# Patient Record
Sex: MALE | Race: OTHER
[De-identification: names, ages, dates, MRNs, and addresses within clinical notes are randomized per-mention and may not be internally consistent; named-entity substitution may affect disease eponyms.]

---

## 2017-05-13 ENCOUNTER — HOSPITAL ENCOUNTER (EMERGENCY)
Dept: HOSPITAL 53 - M ED | Age: 47
Discharge: HOME | End: 2017-05-13
Payer: SELF-PAY

## 2017-05-13 VITALS — WEIGHT: 185 LBS | HEIGHT: 72 IN | BODY MASS INDEX: 25.06 KG/M2

## 2017-05-13 VITALS — DIASTOLIC BLOOD PRESSURE: 65 MMHG | SYSTOLIC BLOOD PRESSURE: 114 MMHG

## 2017-05-13 DIAGNOSIS — L02.411: Primary | ICD-10-CM

## 2017-05-16 ENCOUNTER — HOSPITAL ENCOUNTER (EMERGENCY)
Dept: HOSPITAL 53 - M ED | Age: 47
Discharge: HOME | End: 2017-05-16
Payer: COMMERCIAL

## 2017-05-16 VITALS — BODY MASS INDEX: 24.38 KG/M2 | HEIGHT: 72 IN | WEIGHT: 180 LBS

## 2017-05-16 VITALS — DIASTOLIC BLOOD PRESSURE: 63 MMHG | SYSTOLIC BLOOD PRESSURE: 101 MMHG

## 2017-05-16 DIAGNOSIS — L73.2: Primary | ICD-10-CM

## 2017-10-16 ENCOUNTER — HOSPITAL ENCOUNTER (EMERGENCY)
Dept: HOSPITAL 53 - M ED | Age: 47
Discharge: HOME | End: 2017-10-16
Payer: COMMERCIAL

## 2017-10-16 VITALS
HEIGHT: 72 IN | WEIGHT: 187.83 LBS | BODY MASS INDEX: 25.44 KG/M2 | SYSTOLIC BLOOD PRESSURE: 117 MMHG | DIASTOLIC BLOOD PRESSURE: 74 MMHG

## 2017-10-16 DIAGNOSIS — I51.9: ICD-10-CM

## 2017-10-16 DIAGNOSIS — L02.411: Primary | ICD-10-CM

## 2017-10-16 DIAGNOSIS — Z95.0: ICD-10-CM

## 2017-10-16 DIAGNOSIS — Z87.891: ICD-10-CM

## 2018-01-24 ENCOUNTER — HOSPITAL ENCOUNTER (OUTPATIENT)
Dept: HOSPITAL 53 - M OPP | Age: 48
Discharge: HOME | End: 2018-01-24
Attending: INTERNAL MEDICINE
Payer: COMMERCIAL

## 2018-01-24 DIAGNOSIS — R00.1: ICD-10-CM

## 2018-01-24 DIAGNOSIS — Z87.891: ICD-10-CM

## 2018-01-24 DIAGNOSIS — G71.11: ICD-10-CM

## 2018-01-24 DIAGNOSIS — R13.10: Primary | ICD-10-CM

## 2018-01-24 DIAGNOSIS — Z95.0: ICD-10-CM

## 2018-01-24 DIAGNOSIS — R12: ICD-10-CM

## 2018-01-24 DIAGNOSIS — K31.89: ICD-10-CM

## 2018-01-24 PROCEDURE — 43239 EGD BIOPSY SINGLE/MULTIPLE: CPT

## 2021-05-26 ENCOUNTER — HOSPITAL ENCOUNTER (INPATIENT)
Dept: HOSPITAL 53 - M ED | Age: 51
LOS: 1 days | Discharge: HOME | DRG: 139 | End: 2021-05-27
Attending: STUDENT IN AN ORGANIZED HEALTH CARE EDUCATION/TRAINING PROGRAM | Admitting: STUDENT IN AN ORGANIZED HEALTH CARE EDUCATION/TRAINING PROGRAM
Payer: SELF-PAY

## 2021-05-26 VITALS — SYSTOLIC BLOOD PRESSURE: 112 MMHG | DIASTOLIC BLOOD PRESSURE: 71 MMHG

## 2021-05-26 VITALS — WEIGHT: 176.81 LBS | HEIGHT: 72 IN | BODY MASS INDEX: 23.95 KG/M2

## 2021-05-26 VITALS — DIASTOLIC BLOOD PRESSURE: 76 MMHG | SYSTOLIC BLOOD PRESSURE: 126 MMHG

## 2021-05-26 DIAGNOSIS — J98.11: ICD-10-CM

## 2021-05-26 DIAGNOSIS — Z87.891: ICD-10-CM

## 2021-05-26 DIAGNOSIS — L02.411: ICD-10-CM

## 2021-05-26 DIAGNOSIS — Z20.822: ICD-10-CM

## 2021-05-26 DIAGNOSIS — R09.02: ICD-10-CM

## 2021-05-26 DIAGNOSIS — Z95.0: ICD-10-CM

## 2021-05-26 DIAGNOSIS — L73.2: ICD-10-CM

## 2021-05-26 DIAGNOSIS — D72.829: ICD-10-CM

## 2021-05-26 DIAGNOSIS — R50.9: ICD-10-CM

## 2021-05-26 DIAGNOSIS — R00.1: ICD-10-CM

## 2021-05-26 DIAGNOSIS — Z79.899: ICD-10-CM

## 2021-05-26 DIAGNOSIS — J18.9: Primary | ICD-10-CM

## 2021-05-26 LAB
ALBUMIN SERPL BCG-MCNC: 3.4 GM/DL (ref 3.2–5.2)
ALT SERPL W P-5'-P-CCNC: 30 U/L (ref 12–78)
BASOPHILS # BLD AUTO: 0 10^3/UL (ref 0–0.2)
BASOPHILS NFR BLD AUTO: 0.3 % (ref 0–1)
BILIRUB CONJ SERPL-MCNC: 0.3 MG/DL (ref 0–0.2)
BILIRUB SERPL-MCNC: 0.9 MG/DL (ref 0.2–1)
BUN SERPL-MCNC: 6 MG/DL (ref 7–18)
CALCIUM SERPL-MCNC: 9.6 MG/DL (ref 8.5–10.1)
CHLORIDE SERPL-SCNC: 103 MEQ/L (ref 98–107)
CK MB CFR.DF SERPL CALC: 0.63
CK MB SERPL-MCNC: 3.5 NG/ML (ref ?–3.6)
CK SERPL-CCNC: 559 U/L (ref 39–308)
CO2 SERPL-SCNC: 29 MEQ/L (ref 21–32)
CREAT SERPL-MCNC: 0.7 MG/DL (ref 0.7–1.3)
EOSINOPHIL # BLD AUTO: 0 10^3/UL (ref 0–0.5)
EOSINOPHIL NFR BLD AUTO: 0 % (ref 0–3)
ETHANOL SERPL-MCNC: < 0.003 % (ref 0–0.01)
GFR SERPL CREATININE-BSD FRML MDRD: > 60 ML/MIN/{1.73_M2} (ref 60–?)
GLUCOSE SERPL-MCNC: 92 MG/DL (ref 70–100)
HCT VFR BLD AUTO: 52 % (ref 42–52)
HGB BLD-MCNC: 16.8 G/DL (ref 13.5–17.5)
LYMPHOCYTES # BLD AUTO: 1.1 10^3/UL (ref 1.5–5)
LYMPHOCYTES NFR BLD AUTO: 8 % (ref 24–44)
MCH RBC QN AUTO: 30.2 PG (ref 27–33)
MCHC RBC AUTO-ENTMCNC: 32.3 G/DL (ref 32–36.5)
MCV RBC AUTO: 93.5 FL (ref 80–96)
MONOCYTES # BLD AUTO: 1.3 10^3/UL (ref 0–0.8)
MONOCYTES NFR BLD AUTO: 9.4 % (ref 2–8)
NEUTROPHILS # BLD AUTO: 11.4 10^3/UL (ref 1.5–8.5)
NEUTROPHILS NFR BLD AUTO: 81.7 % (ref 36–66)
PLATELET # BLD AUTO: 220 10^3/UL (ref 150–450)
POTASSIUM SERPL-SCNC: 4.5 MEQ/L (ref 3.5–5.1)
PROT SERPL-MCNC: 7.6 GM/DL (ref 6.4–8.2)
RBC # BLD AUTO: 5.56 10^6/UL (ref 4.3–6.1)
SODIUM SERPL-SCNC: 138 MEQ/L (ref 136–145)
T4 FREE SERPL-MCNC: 1.04 NG/DL (ref 0.76–1.46)
TROPONIN I SERPL-MCNC: < 0.02 NG/ML (ref ?–0.1)
TSH SERPL DL<=0.005 MIU/L-ACNC: 0.48 UIU/ML (ref 0.36–3.74)
WBC # BLD AUTO: 13.9 10^3/UL (ref 4–10)

## 2021-05-26 RX ADMIN — DEXTROSE MONOHYDRATE SCH MLS/HR: 5 INJECTION INTRAVENOUS at 16:35

## 2021-05-26 NOTE — REP
INDICATION:

fever, hypoxia



COMPARISON:

None



TECHNIQUE:

Axial noncontrast images from the thoracic inlet to the upper abdomen with coronal and

sagittal reformations.



This CT examination was performed using the following dose reduction techniques:

Automated exposure control, adjustment of mA and/or kv according to the patient's

size, and use of iterative reconstruction technique.



FINDINGS:

Mild to moderate bibasilar atelectasis (left greater than right) noted.  No effusion.

No pneumothorax.  Underlying chronic mild emphysematous changes suggested.

Tracheobronchial tree is patent.  No obvious adenopathy by noncontrast evaluation.

Mediastinum demonstrates normal thoracic aorta and pulmonary vasculature.  Pacemaker

noted without cardiomegaly or pericardial effusion.



IMPRESSION:

Mild to moderate bibasilar atelectasis.





<Electronically signed by Manish Butler > 05/26/21 0944

## 2021-05-26 NOTE — REPVR
PROCEDURE INFORMATION: 

Exam: XR Chest 

Exam date and time: 5/26/2021 6:03 AM 

Age: 50 years old 

Clinical indication: Pain; Other: Not specified; Additional info: Trauma 



TECHNIQUE: 

Imaging protocol: XR of the chest. 

Views: 2 views. 



COMPARISON: 

CR Chest, 2 view PA, Lat 6/1/2016 8:22 AM 



FINDINGS: 

Lungs: Comparison to the previous chest radiograph from 06/01/2016 shows 

interval development of subsegmental atelectasis in the right lung base. The 

remainder of the lungs are otherwise well-aerated. 

Pleural spaces: Unremarkable. No pleural effusion. No pneumothorax. 

Heart/Mediastinum: A left subclavian dual-chamber pacemaker is present in 

satisfactory position, unchanged. The heart size is normal. 

Bones/joints: Unremarkable. 



IMPRESSION: 



1. Comparison to the previous chest radiograph from 06/01/2016 shows interval 

development of subsegmental atelectasis in the right lung base. The remainder 

of the lungs are otherwise well-aerated. 



2. A left subclavian dual-chamber pacemaker is present in satisfactory 

position, unchanged. The heart size is normal. 







Electronically signed by: Damion Selby On 05/26/2021  06:54:11 AM

## 2021-05-26 NOTE — HPEPDOC
General


Date of Admission


May 26, 2021 at 12:36


Date of Service:  May 26, 2021


Chief Complaint


The patient is a 50-year-old male admitted with a reason for visit of Fever, 

Hypoxia, & Leukocytosis.


Source:  Patient





History of Present Illness


Mr. De Santiago is a 50 year old male with bradycardia s/p pacemaker placement who 

presents after a fall and generalized weakness. Yesterday he had his second 

COVID vaccination. He does not know which brand he had, but he did not have any 

symptoms after the first COVID vaccination. After his second vaccination, his 

arm was sore. He was sitting on the couch when he fell to the ground. He denies 

loss of consciousness, but reported lightheadedness/dizzines, blurry vision, and

generalized weakness. His arms were sore. His legs were weak. He could not stand

or crawl. He told me he was down for about 8 hours. Afterwards, he reached onto 

the couch and grabbed his phone to call 911. He did not tell me why he waited 8 

hours, but he said he could not reach initially. He was brought to the ED for 

evaluation. Work up in the ED was significant for leukocytosis of 13.9, hypoxia 

with pO2 of 60, and fever of 101. On examination, patient had course 

rales/rhonchi. When I saw patient, he was laying in bed. he had a red right eye,

but says that he had bumped his mask into that eye. Otherwise, he had a cough 

since yesterday. It is a nonproductive cough. Imaging did not suggest 

infiltrate, but atelectasis, but he may have early pneumonia. Otherwise, no 

diarrhea or abdominal pain to suggest GI pathology. No neck stiffness or 

confusion to suggest brain pathology. Patient will be admitted for CAP.





Home Medications


No Active Prescriptions or Reported Meds





Allergies


Coded Allergies:  


     No Known Allergies (Unverified , 5/26/21)





Past Medical History


Medical History


1. Bradycardia requiring pacemaker


Surgical History


1. Pacemaker placement





Family History


Denies knowledge of medical history in parents





Social History


* Smoker:  former Smoker (Quit 10 years ago, about 1 pack per month)


Alcohol:  occationally (Social drinker of beer)


Drugs:  denies





A-FIB/CHADSVASC


A-FIB History


Current/History of A-Fib/PAF?:  No





Review of Systems


Constitutional:  Reports: Weakness; 


   Denies: Chills


Eyes:  Reports: Vision change (Blurry vision this morning when falling)


ENT:  Denies: Sore Throat


Skin:  Reports: Rash (Boil in right armpit)


Pulmonary:  Reports: Cough (Non productive); 


   Denies: Dyspnea


Cardiovascular:  Reports: Lt Headedness; 


   Denies: Chest Pain


Gastrointestinal:  Denies: Abdominal Pain, Diarrhea


Genitourinary:  Denies: Dysuria


Hematologic:  Denies: Bruising


Musculoskeletal:  Denies: Neck Pain, Back Pain


Neurological:  Denies: Numbness





Physical Examination


General Exam:  Positive: Alert, Cooperative


Eye Exam:  Positive: EOMI, Other Eye Symptoms (Eye redness in right eye, says he

poked his eye with mask accidentially); 


   Negative: Sclera icteric


Neck Exam:  Positive: Supple


Chest Exam:  Positive: Rales, Rhonchi


Heart Exam:  Positive: Rate Normal, Regular Rhythm


Abdomen Exam:  Positive: Normal bowel sounds, Soft; 


   Negative: Tenderness


Extremity Exam:  Negative: Edema


Skin Exam:  Positive: Other skin issue (dry)


Neuro Exam:  Positive: Normal Speech, Cranial Nerves 3-12 NL


Psych Exam:  Positive: Oriented x 3, Other (sometimes evasive with questions)





Vital Signs





Vital Signs








  Date Time  Temp Pulse Resp B/P (MAP) Pulse Ox O2 Delivery O2 Flow Rate FiO2


 


5/26/21 12:00  67 18 118/68 (85) 98 Nasal Cannula 1.0 


 


5/26/21 10:15 97.6       











Laboratory Data


Labs 24H


Laboratory Tests 2


5/26/21 05:43: 


Immature Granulocyte % (Auto) 0.6, Neutrophils (%) (Auto) 81.7H, Lymphocytes (%)

(Auto) 8.0L, Monocytes (%) (Auto) 9.4H, Eosinophils (%) (Auto) 0.0, Basophils 

(%) (Auto) 0.3, Neutrophils # (Auto) 11.4H, Lymphocytes # (Auto) 1.1L, Monocytes

# (Auto) 1.3H, Eosinophils # (Auto) 0.0, Basophils # (Auto) 0.0, Nucleated Red 

Blood Cells % (auto) 0.0, Anion Gap 6L, Glomerular Filtration Rate > 60.0, Leon

cium Level 9.6, Total Bilirubin 0.9, Direct Bilirubin 0.3H, Aspartate Amino 

Transf (AST/SGOT) 30, Alanine Aminotransferase (ALT/SGPT) 30, Alkaline 

Phosphatase 98, Total Creatine Kinase 559H, Creatine Kinase MB 3.5, Creatine 

Kinase MB Relative Index 0.63, Troponin I < 0.02, Total Protein 7.6, Albumin 

3.4, Albumin/Globulin Ratio 0.8, Thyroid Stimulating Hormone (TSH) 0.478, Free 

Thyroxine 1.04, Ethyl Alcohol Level < 0.003


5/26/21 08:11: 


POC pH (Misc Panel) 7.404, POC Base Excess (Misc Panel) 1.0, POC Saturated 

Percent O2 (Misc) 91L, POC pO2 (Misc Panel) 60.0L, POC pCO2 (Misc Panel) 40.5, 

POC HCO3 (Misc Panel) 25.4, POC Total CO2 (Misc Panel) 27.0


5/26/21 10:58: 


Urine Color YELLOW, Urine Appearance CLEAR, Urine pH 6.0, Urine Specific Gravity

1.013, Urine Protein NEGATIVE, Urine Glucose (UA) NEGATIVE, Urine Ketones 

TRACEH, Urine Blood NEGATIVE, Urine Nitrite NEGATIVE, Urine Bilirubin NEGATIVE, 

Urine Urobilinogen 4.0H, Urine Leukocyte Esterase NEGATIVE, Urine WBC (Auto) 2, 

Urine RBC (Auto) 3, Urine Hyaline Casts (Auto) 0, Urine Bacteria (Auto) N

EGATIVE, Urine Squamous Epithelial Cells 1, Urine Mucus (Auto) SMALL, Urine 

Sperm (Auto) 


5/26/21 11:44: POC Lactate (Misc Panel) 0.87


CBC/BMP


Laboratory Tests


5/26/21 05:43








Microbiology





Microbiology


5/26/21 Blood Culture, Received


          Pending


5/26/21 Blood Culture, Received


          Pending


5/26/21 Respiratory Virus Panel (PCR) ([facility]) - Final, Complete





 Assessment/Plan


Mr. De Santiago is a 50 year old male with bradycardia s/p pacemaker placement who 

presents after a fall and generalized weakness. He has had a cough which started

yesterday and his lungs had rales/rhonchi. He also has hypoxia, fever, and 

leukocytosis. Although imaging suggested atelectasis, he may have early 

pneumonia. Patient will be treated for CAP with ceftriaxone and doxycycline. 

Patient qTC is 505, thus doxycycline was chosen over azithromycin. 





His weakness may be from his pneumonia. Physical therapy ordered.





Plan / VTE


VTE Prophylaxis Ordered?:  Yes





Plan


Plan


1. CAP


-Hypoxia, fever, cough, and leukocytosis


-Respiratory panel negative


-Ceftriaxone and doxycycline day 1


-Imaging demonstrated atelectasis. Will order IS


-Ordered sputum culture, legionella, and strep


-Blood cultures pending


-ESR and CRP pending





2. Bradycardia s/p pacemaker


-Patient does not know why he had bradycardia. Denies history of tick bite


-Patient had history of complete heart block


-Supportive care and monitoring heart rate





3. DVT ppx


-Lovenox





Disposition: Pending clinical improvement











JEANE LARKIN DO               May 26, 2021 14:17

## 2021-05-26 NOTE — REPVR
PROCEDURE INFORMATION: 

Exam: CT Head Without Contrast 

Exam date and time: 5/26/2021 3:17 PM 

Age: 50 years old 

Clinical indication: Injury or trauma; Fall; Blunt trauma (contusions or 

hematomas) 



TECHNIQUE: 

Imaging protocol: Computed tomography of the head without contrast. 

Radiation optimization: All CT scans at this facility use at least one of these 

dose optimization techniques: automated exposure control; mA and/or kV 

adjustment per patient size (includes targeted exams where dose is matched to 

clinical indication); or iterative reconstruction. 



COMPARISON: 

No relevant prior studies available. 



FINDINGS: 

Brain: Mild-to-moderate global parenchymal atrophy atypical in this age group. 

Cerebral ventricles: No ventriculomegaly. 

Paranasal sinuses: Visualized sinuses are unremarkable. No fluid levels. 

Mastoid air cells: Visualized mastoid air cells are well aerated. 

Bones/joints: Unremarkable. No acute fracture. 

Soft tissues: Unremarkable. 



Nasal cavity: Bilateral caitlin bullosa. 



IMPRESSION: 

1. Mild-to-moderate global parenchymal atrophy atypical in this age group. 

2. No acute intracranial findings. 



Electronically signed by: Mati Garrett On 05/26/2021  19:21:55 PM

## 2021-05-27 VITALS — SYSTOLIC BLOOD PRESSURE: 118 MMHG | DIASTOLIC BLOOD PRESSURE: 73 MMHG

## 2021-05-27 VITALS — DIASTOLIC BLOOD PRESSURE: 76 MMHG | SYSTOLIC BLOOD PRESSURE: 123 MMHG

## 2021-05-27 LAB
BUN SERPL-MCNC: 7 MG/DL (ref 7–18)
CALCIUM SERPL-MCNC: 9.1 MG/DL (ref 8.5–10.1)
CHLORIDE SERPL-SCNC: 108 MEQ/L (ref 98–107)
CO2 SERPL-SCNC: 28 MEQ/L (ref 21–32)
CREAT SERPL-MCNC: 0.6 MG/DL (ref 0.7–1.3)
GFR SERPL CREATININE-BSD FRML MDRD: > 60 ML/MIN/{1.73_M2} (ref 60–?)
GLUCOSE SERPL-MCNC: 89 MG/DL (ref 70–100)
HCT VFR BLD AUTO: 49.9 % (ref 42–52)
HGB BLD-MCNC: 15.9 G/DL (ref 13.5–17.5)
MCH RBC QN AUTO: 30.2 PG (ref 27–33)
MCHC RBC AUTO-ENTMCNC: 31.9 G/DL (ref 32–36.5)
MCV RBC AUTO: 94.7 FL (ref 80–96)
PLATELET # BLD AUTO: 195 10^3/UL (ref 150–450)
POTASSIUM SERPL-SCNC: 3.7 MEQ/L (ref 3.5–5.1)
RBC # BLD AUTO: 5.27 10^6/UL (ref 4.3–6.1)
SODIUM SERPL-SCNC: 140 MEQ/L (ref 136–145)
WBC # BLD AUTO: 11.1 10^3/UL (ref 4–10)

## 2021-05-27 RX ADMIN — DEXTROSE MONOHYDRATE SCH MLS/HR: 5 INJECTION INTRAVENOUS at 03:01

## 2021-05-27 NOTE — DS.PDOC
Discharge Summary


General


Date of Admission


May 26, 2021 at 12:36


Date of Discharge


May 27, 2021





Discharge Summary


PROCEDURES PERFORMED DURING STAY: None





ADMITTING DIAGNOSES: 


1. Community acquired pneumonia


2. Hidradenitis suppurativa


3. Bradycardia s/p pacemaker





DISCHARGE DIAGNOSES:


1. Community acquired pneumonia


2. Hidradenitis suppurativa


3. Bradycardia s/p pacemaker





COMPLICATIONS/CHIEF COMPLAINT: Fever, Hypoxia, & Leukocytosis.





HISTORY OF PRESENT ILLNESS: Mr. De Santiago is a 50 year old male with bradycardia 

s/p pacemaker placement who presents after a fall and generalized weakness. 

Yesterday he had his second COVID vaccination. He does not know which brand he 

had, but he did not have any symptoms after the first COVID vaccination. After 

his second vaccination, his arm was sore. He was sitting on the couch when he 

fell to the ground. He denies loss of consciousness, but reported lighthe

adedness/dizzines, blurry vision, and generalized weakness. His arms were sore. 

His legs were weak. He could not stand or crawl. He told me he was down for 

about 8 hours. Afterwards, he reached onto the couch and grabbed his phone to 

call 911. He did not tell me why he waited 8 hours, but he said he could not 

reach initially. He was brought to the ED for evaluation. Work up in the ED was 

significant for leukocytosis of 13.9, hypoxia with pO2 of 60, and fever of 101. 

On examination, patient had course rales/rhonchi. When I saw patient, he was 

laying in bed. he had a red right eye, but says that he had bumped his mask into

that eye. Otherwise, he had a cough since yesterday. It is a nonproductive 

cough. Imaging did not suggest infiltrate, but atelectasis, but he may have 

early pneumonia. Otherwise, no diarrhea or abdominal pain to suggest GI 

pathology. No neck stiffness or confusion to suggest brain pathology. Patient 

will be admitted for CAP.





HOSPITAL COURSE: Later that day, nurse noted patient's right armpit had a lump 

with foul smelling pus. I examined the area. Does not appear to be cellulitis. 

No erythema, but there was pus that could be expressed. Cultures were taken. 

Otherwise, patient did well overnight and was afebril. The next day, his lungs 

sounded completely clear. He felt well and felt ready for home. He recovered 

quicker than expected. Patient was discharged home with doxycycline and 

cefdinir.





DISCHARGE MEDICATIONS: Please see below.


 


ALLERGIES: Please see below.





PHYSICAL EXAMINATION ON DISCHARGE:


VITAL SIGNS: Please see below.


GENERAL: Comfortable, in no apparent distress.


HEENT: Head normocephalic/atraumatic, EOMI.


NECK: Supple.


RESPIRATORY: Lungs clear to auscultation bilaterally, no rales, wheeze or 

rhonchi.


CARDIOVASCULAR: Regular rate and rhythm.


ABDOMEN: Soft, nontender, no guarding or rebound tenderness. Normal bowel 

sounds.


MUSCLE SKELETAL: Muscle strength 5/5 in all extremities.


NEUROLOGICAL:  grossly intact, no focal deficits noted.


PSYCHOLOGICAL: Normal mood and affect





LABORATORY DATA: Please see below.





IMAGING: Radiologist interpretation


CT chest without contrast


Mild to moderate bibasilar atelectasis.





CT head without contrast


1. Mild-to-moderate global parenchymal atrophy atypical in this age group. 


2. No acute intracranial findings. 





PROGNOSIS: Good





ACTIVITY: As tolerated.





DIET: As tolerated





DISCHARGE PLAN: Home





DISPOSITION: 01 Home, Self-Care.





DISCHARGE INSTRUCTIONS:


1. Follow up with your PCP in 1 week


2. Take cefdinir and doxycycline to completion





ITEMS TO FOLLOWUP ON ON OUTPATIENT:


1. Abscess culture





DISCHARGE CONDITION: Stable





Total time spent on discharge planning, discharge summary, and medication 

reconciliation: 55 minutes





Vital Signs/I&Os





Vital Signs








  Date Time  Temp Pulse Resp B/P (MAP) Pulse Ox O2 Delivery O2 Flow Rate FiO2


 


5/27/21 10:45 99.6 86 16 118/73 (88) 94 Room Air  


 


5/26/21 14:30       1.0 














I&O- Last 24 Hours up to 6 AM 


 


 5/27/21





 06:00


 


Intake Total 1750 ml


 


Output Total 400 ml


 


Balance 1350 ml











Laboratory Data


Labs 24H


Laboratory Tests 2


5/27/21 07:44: 


Nucleated Red Blood Cells % (auto) 0.0, Anion Gap 4L, Glomerular Filtration Rate

> 60.0, Calcium Level 9.1


CBC/BMP


Laboratory Tests


5/27/21 07:44











Microbiology





Microbiology


5/26/21 Gram Stain - Final, Resulted


          


5/26/21 Abscess Culture, Resulted


          Pending


5/26/21 Blood Culture - Preliminary, Resulted


          No growth after 24 hours . All specim...


5/26/21 Blood Culture - Preliminary, Resulted


          No growth after 24 hours . All specim...


5/26/21 Respiratory Virus Panel (PCR) ([facility]) - Final, Complete





Discharge Medications


Scheduled


Cefdinir (Cefdinir) 300 Mg Capsule, 300 MG PO BID


Doxycycline Hyclate (Doxycycline Hyclate) 100 Mg Capsule, 100 MG PO BID





Allergies


Coded Allergies:  


     No Known Allergies (Unverified , 5/26/21)











JEANE LARKIN DO               May 27, 2021 23:41

## 2021-05-27 NOTE — ECGEPIP
[facility] - ED

                                       

                                       Test Date:    2021

Pat Name:     ESTEFANÍA LECHUGA         Department:   

Patient ID:   [id number]                 Room:         -

Gender:       Male                     Technician:   HC

:          1970               Requested By: AMADA OLIVEROS

Order Number: [id number]     Reading MD:   Yury Guerrero

                                 Measurements

Intervals                              Axis          

Rate:         97                       P:            78

NC:           224                      QRS:          -58

QRSD:         160                      T:            107

QT:           398                                    

QTc:          505                                    

                           Interpretive Statements

Atrial-sensed ventricular-paced rhythm with prolonged AV conduction

SIMILAR TO 16

Electronically Signed on 2021 5:14:06 EDT by Yury Guerrero

## 2021-10-06 ENCOUNTER — HOSPITAL ENCOUNTER (INPATIENT)
Dept: HOSPITAL 53 - M ED | Age: 51
LOS: 2 days | Discharge: HOME | DRG: 193 | End: 2021-10-08
Attending: INTERNAL MEDICINE | Admitting: INTERNAL MEDICINE
Payer: MEDICARE

## 2021-10-06 VITALS — WEIGHT: 161.38 LBS | BODY MASS INDEX: 21.86 KG/M2 | HEIGHT: 72 IN

## 2021-10-06 VITALS — SYSTOLIC BLOOD PRESSURE: 132 MMHG | DIASTOLIC BLOOD PRESSURE: 83 MMHG

## 2021-10-06 DIAGNOSIS — Z95.0: ICD-10-CM

## 2021-10-06 DIAGNOSIS — E87.2: ICD-10-CM

## 2021-10-06 DIAGNOSIS — J98.11: ICD-10-CM

## 2021-10-06 DIAGNOSIS — R53.1: ICD-10-CM

## 2021-10-06 DIAGNOSIS — R26.89: ICD-10-CM

## 2021-10-06 DIAGNOSIS — Z87.891: ICD-10-CM

## 2021-10-06 DIAGNOSIS — J96.01: ICD-10-CM

## 2021-10-06 DIAGNOSIS — J43.9: ICD-10-CM

## 2021-10-06 DIAGNOSIS — J18.9: Primary | ICD-10-CM

## 2021-10-06 DIAGNOSIS — G71.00: ICD-10-CM

## 2021-10-06 DIAGNOSIS — Z20.822: ICD-10-CM

## 2021-10-06 DIAGNOSIS — Z79.899: ICD-10-CM

## 2021-10-06 LAB
ALBUMIN SERPL BCG-MCNC: 3 GM/DL (ref 3.2–5.2)
ALT SERPL W P-5'-P-CCNC: 16 U/L (ref 12–78)
APTT BLD: 33.1 SECONDS (ref 25.9–37)
BASE EXCESS BLDV CALC-SCNC: 4.3 MMOL/L (ref -2–2)
BASOPHILS # BLD AUTO: 0 10^3/UL (ref 0–0.2)
BASOPHILS NFR BLD AUTO: 0.2 % (ref 0–1)
BILIRUB CONJ SERPL-MCNC: 0.2 MG/DL (ref 0–0.2)
BILIRUB SERPL-MCNC: 0.4 MG/DL (ref 0.2–1)
BUN SERPL-MCNC: 7 MG/DL (ref 7–18)
CALCIUM SERPL-MCNC: 9.6 MG/DL (ref 8.5–10.1)
CHLORIDE SERPL-SCNC: 103 MEQ/L (ref 98–107)
CK MB CFR.DF SERPL CALC: 1.56
CK MB SERPL-MCNC: 3.1 NG/ML (ref ?–3.6)
CK SERPL-CCNC: 199 U/L (ref 39–308)
CO2 BLDV CALC-SCNC: 33.7 MEQ/L (ref 24–28)
CO2 SERPL-SCNC: 33 MEQ/L (ref 21–32)
CREAT SERPL-MCNC: 0.64 MG/DL (ref 0.7–1.3)
EOSINOPHIL # BLD AUTO: 0.1 10^3/UL (ref 0–0.5)
EOSINOPHIL NFR BLD AUTO: 0.5 % (ref 0–3)
GFR SERPL CREATININE-BSD FRML MDRD: > 60 ML/MIN/{1.73_M2} (ref 56–?)
GLUCOSE SERPL-MCNC: 87 MG/DL (ref 70–100)
HCO3 BLDV-SCNC: 31.9 MEQ/L (ref 23–27)
HCO3 STD BLDV-SCNC: 27.4 MEQ/L
HCT VFR BLD AUTO: 45.9 % (ref 42–52)
HGB BLD-MCNC: 14.4 G/DL (ref 13.5–17.5)
INR PPP: 1.18
LYMPHOCYTES # BLD AUTO: 2.5 10^3/UL (ref 1.5–5)
LYMPHOCYTES NFR BLD AUTO: 19.8 % (ref 24–44)
MCH RBC QN AUTO: 29.5 PG (ref 27–33)
MCHC RBC AUTO-ENTMCNC: 31.4 G/DL (ref 32–36.5)
MCV RBC AUTO: 94.1 FL (ref 80–96)
MONOCYTES # BLD AUTO: 1 10^3/UL (ref 0–0.8)
MONOCYTES NFR BLD AUTO: 7.5 % (ref 2–8)
NEUTROPHILS # BLD AUTO: 9.1 10^3/UL (ref 1.5–8.5)
NEUTROPHILS NFR BLD AUTO: 71.3 % (ref 36–66)
NT-PRO BNP: 268 PG/ML (ref ?–125)
PCO2 BLDV: 59.5 MMHG (ref 38–50)
PH BLDV: 7.35 UNITS (ref 7.33–7.43)
PLATELET # BLD AUTO: 352 10^3/UL (ref 150–450)
PO2 BLDV: 35.6 MMHG (ref 30–50)
POTASSIUM SERPL-SCNC: 4.2 MEQ/L (ref 3.5–5.1)
PROT SERPL-MCNC: 8.1 GM/DL (ref 6.4–8.2)
PROTHROMBIN TIME: 15.5 SECONDS (ref 12.7–14.5)
RBC # BLD AUTO: 4.88 10^6/UL (ref 4.3–6.1)
SAO2 % BLDV: 64 % (ref 60–80)
SODIUM SERPL-SCNC: 140 MEQ/L (ref 136–145)
T4 SERPL-MCNC: 8.9 UG/DL (ref 4.5–12)
TROPONIN I SERPL-MCNC: < 0.02 NG/ML (ref ?–0.1)
TSH SERPL DL<=0.005 MIU/L-ACNC: 1.43 UIU/ML (ref 0.36–3.74)
WBC # BLD AUTO: 12.8 10^3/UL (ref 4–10)

## 2021-10-06 RX ADMIN — DEXTROSE MONOHYDRATE SCH MLS/HR: 5 INJECTION INTRAVENOUS at 23:03

## 2021-10-06 NOTE — HPEPDOC
[facility] Medical History & Physical


Date of Admission


Oct 6, 2021


Date of Service:  Oct 6, 2021


Attending Physician:  ANABELL OSBORN MD





History and Physical


CHIEF COMPLAINT:


Shortness of breath and lightheadedness





HISTORY OF PRESENT ILLNESS:


Douglas is a pleasant 50yo male w/ notable PMHx of bradycardia/heart block s/p 

pacemaker (2018) and unspecified musculoskeletal weakness issue (uses cane for 

ambulation, follows w/ neurology in [place]) who presented to the [facility] ED on 

10/6/21 with a chief complaint of shortness of breath and lightheadedness.  

Patient story dates back roughly a week and a half when he began feeling 

lightheaded with intermittent episodes of dyspnea and generalized malaise.  For 

about a week, the patient had decreased appetite with corresponding decreased 

oral intake with no vomiting or abdominal pain.  He continued to use his as 

needed home albuterol inhaler for the intermittent shortness of breath episodes 

and implemented rest and supportive care at home.  Roughly 48 hours ago, the 

patient's symptoms had significantly improved and he was feeling much better.  

His appetite was improving and he was in the process of making a meal this 

afternoon when he became acutely short of breath, "gasping for air."  He was 

breathing rapidly and bringing up yellow phlegm.  He denies any associated 

fever, chills, night sweats, recent unintentional change in weight, hemoptysis, 

chest pain, pleuritic chest pain, palpitations, abdominal 

pain/nausea/vomiting/diarrhea/blood in stool, dysuria, hematuria, new lumps or 

bumps, or easy bleeding or bruising.  In addition, the patient denies any recent

changes in his medication regimen, extensive travel, known sick contact 

exposure.





Of note, patient reports he usually gets similar episodes of dyspnea a couple 

times a year at the change of season.  He was most recently admitted for 

community-acquired pneumonia in May 2021.





In the ED, the patient was found to be hypoxic (initial SPO2 83%) and started on

nasal cannula supplemental oxygen.  He was also significantly tachypneic and 

tachycardic.  Labs showed leukocytosis with left shift, lactic acid of 2.3.  

Negative respiratory viral panel.  Chest x-ray showed a right lower lobe deve

loping pneumonia versus atelectasis.  CTA was negative for PE.  Patient was 

administered one-time dose of ceftriaxone.





[facility]





PAST MEDICAL HISTORY:


Bradycardia and complete heart block status post pacemaker placement


Unspecified musculoskeletal condition requiring cane for ambulation, patient 

follows with neurology in [place]


     -Patient states that he has muscular dystrophy and that was diagnosed 

roughly 5-6 years ago





PAST SURGICAL HISTORY:


Pacemaker placement in March 2018





SOCIAL HISTORY:


Patient lives with his girlfriend in [facility].  He is currently on disability due to

his unspecified musculoskeletal issue and is a former member of the [employer].


Patient is a former cigarette smoker having quit 3 years ago.  Prior to 

quitting, he had smoked for 15 years, averaging roughly 1 pack/month.


Patient drinks alcohol on an intermittent basis, usually having 2-3 beers per 

week.


Patient formally smoked marijuana and denies any other illegal and/or IV drug 

use.





FAMILY HISTORY:


Patient reports no significant medical history in first-degree relatives.





ALLERGIES:


Please see below.





REVIEW OF SYSTEMS:


10 point review of systems complete, all negative otherwise stated in HPI





HOME MEDICATIONS:


Please see below. 





PHYSICAL EXAMINATION:


VITAL SIGNS: Temperature 98.6, pulse 91, respiratory rate 24, blood pressure 

117/77, pulse oximetry 93% on 4 L nasal cannula supplemental oxygen.


GENERAL APPEARANCE: Pleasant darker skinned male lying upright in bed.  Appears 

fatigued as well as somewhat older than stated age.


HEENT: Normocephalic, atraumatic.  There are areas on the vertex of his scalp 

with slightly erythematous borders and almost mild depigmentation centrally.  

Wearing nasal cannula supplemental oxygen.  Noninjected, anicteric sclera.  No 

significant conjunctival pallor appreciated.  PERRLA.


Oral cavity: No pharyngeal erythema or exudate appreciated.


Neck: No lymphadenopathy appreciated.  Trachea midline.


CARDIOVASCULAR: Borderline tachycardic rate, regular rhythm.  Normal S1, S2.  No

murmurs or rubs appreciated.


LUNGS: Currently breathing on 4 L nasal cannula supplemental oxygen.  Does not 

appear to be in any acute respiratory distress with no visualized accessory 

muscle use.  There are coarse rhonchi throughout all lung fields as well as some

crackles appreciated in the mid to lower lobes posteriorly.  Symmetric chest 

expansion.  Adequate respiratory effort.  Some mild E to a egophony in the mid 

lung region posteriorly.  No conversational dyspnea.


ABDOMEN: Soft, nontender nondistended.  No guarding or rigidity appreciated.  

Normoactive bowel sounds throughout.


EXTREMITIES: Thin extremities with no lower extremity pitting edema.  There is 

an anterior shin abrasion of the left leg.  2+ radial pulses bilaterally.  There

is a shortened left pinky finger.


NEUROLOGICAL: No gross focal neurologic deficits appreciated.  Nondysarthric 

speech.


PSYCHIATRIC: Mood and affect appear appropriate





LABORATORY DATA:


Please see below.





IMAGING:


Portable chest x-ray, 10/6/2021


FINDINGS:


The technique utilized in obtaining the radiograph has magnified the cardiac


silhouette and accentuated the interstitial markings.


The cardiomediastinal silhouette is stable.  Heart is not enlarged.  Discoid 

opacity


left lower lobe retrocardiac region.  The lung fields are hypoexpanded.  The 

pleural


angles are sharp.  The osseous structures are within normal limits.


IMPRESSION:


Right lower lobe opacity subsegmental atelectatic change versus developing pne

umonia.





CT angiography of the chest with contrast, 10/6/2021


FINDINGS: 


Pulmonary arteries: Normal. No pulmonary emboli. 


Aorta: Unremarkable. No aortic aneurysm. No aortic dissection. 


Lungs: Atelectasis at bilateral lung bases. Bilateral paraseptal emphysema, 


right greater than left. 


Pleural spaces: Unremarkable. No pneumothorax. No pleural effusion. 


Heart: Unremarkable. No cardiomegaly. No pericardial effusion. 


Lymph nodes: Unremarkable. No enlarged lymph nodes. 


Liver: Multiple cystic lesions in the liver with the largest measuring 2.6 cm 


in the right hepatic lobe. 


Bones/joints: Unremarkable. No acute fracture. 


Soft tissues: Unremarkable. 


IMPRESSION: 


1. Atelectasis at bilateral lung bases. 


2. No pulmonary embolism. 





MICROBIOLOGY:


Please see below. 





ASSESSMENT & PLAN:


This is a 51-year-old male with history of bradycardia and complete heart block 

status post pacemaker, unspecified musculoskeletal condition with related 

baseline weakness who presented to the ED on 10/6 with a chief complaint of 

acute shortness of breath.  He was hypoxic in the ED with leukocytosis and 

imaging showing developing pneumonia versus atelectasis in the right lower lobe.





#Acute hypoxic respiratory failure -likely secondary to right lower lobe CAP 

versus bilateral atelectasis versus acute exacerbation of paraseptal emphysema


-Patient had been having intermittent shortness of breath over the past week and

a half that acutely got worse today


-Initial O2 saturation on room air of 83% with tachypnea; at time of admission, 

was saturating in the mid 90s on 4 L nasal cannula


-Chest x-ray showed a right lower lobe opacity representative of atelectasis 

versus developing pneumonia


-Patient had diffuse rhonchi and crackles on examination with leukocytosis


-Respiratory viral panel negative with CTA; no PE on CTA


-2 sets of blood cultures were ordered in the ED; sputum culture ordered; pr

ocalcitonin


-incentive spirometry; Acapella; Mucinex twice a day


-s/p 1 dose of ceftriaxone in the ED; daily ceftriaxone ordered along with 

doxycycline IV ( on EKG; azithromycin deferred); continuous pulse ox with

O2 titration orders


-Atypical PNA work-up ordered














#Sepsis -likely secondary to developing community-acquired pneumonia


-Initial white count of 12.8 with absolute neutrophilia, lactic acid of 2.3, 

tachypnea, tachycardia, and hypoxia


-Patient has remained hemodynamically stable since presenting to the ED


-Infectious work-up ordered (chest x-ray imaging findings above), 2 sets of 

blood cultures, sputum culture


-Antimicrobial pharmacotherapy ordered for community-acquired pneumonia














#Right lower lobe opacity, developing community-acquired pneumonia versus 

atelectasis


-See above work-up for possible community-acquired pneumonia














#Lactic acidosis -likely secondary to hypoxia from bilateral atelectasis versus 

community-acquired pneumonia


-Initial serum lactic acid level of 2.3


-Four-hour follow-up lactic acid level came down to 2.0














#Bilateral atelectasis and paraseptal emphysema


-This was seen on both chest x-ray and CTA


-Patient is a former smoker


-There is a potential that this may be an exacerbation of a chronic obstructive 

pulmonary disease rather than community-acquired pneumonia


-Monitor results of infectious work-up and procalcitonin


-Patient likely would warrant outpatient pulmonology assessment upon discharge

















#History of bradycardia and complete heart block s/p pacemaker


Etiology of patient's bradycardia is unknown to patient.  He previously has 

denied any history of a tick bite.


-Telemetry














#History of unspecified musculoskeletal condition


-Patient only recently turned 51 years old and uses a cane for ambulation at 

baseline


-Patient follows with a neurologist in the [place] area and reports being 

diagnosed with muscular dystrophy 5-6 years ago














#DVT prophylaxis: Subcutaneous Lovenox





Code status: Full code





Disposition: Admit to the medical surgical floor and pending clinical 

improvement in respiratory status with treatment for CAP.





Vital Signs





Vital Signs








  Date Time  Temp Pulse Resp B/P (MAP) Pulse Ox O2 Delivery O2 Flow Rate FiO2


 


10/6/21 21:54  89 20 119/78 (92) 95 Nasal Cannula 2.0 


 


10/6/21 16:56 98.6       











Laboratory Data


Labs 24H


Laboratory Tests 2


10/6/21 18:05: 


Prothrombin Time 15.5H, Prothromb Time International Ratio 1.18, Activated 

Partial Thromboplast Time 33.1, Anion Gap 4L, Glomerular Filtration Rate > 60.0,

Calcium Level 9.6, Total Bilirubin 0.4, Direct Bilirubin 0.2, Aspartate Amino T

ransf (AST/SGOT) 20, Alanine Aminotransferase (ALT/SGPT) 16, Alkaline 

Phosphatase 79, Total Creatine Kinase 199, Creatine Kinase MB 3.1, Creatine 

Kinase MB Relative Index 1.56, Troponin I < 0.02, NT-Pro-B-Type Natriuretic 

Peptide 268H, Total Protein 8.1, Albumin 3.0L, Albumin/Globulin Ratio 0.6, Thy

roid Stimulating Hormone (TSH) 1.430, Thyroxine (T4) 8.9


10/6/21 18:06: 


Immature Granulocyte % (Auto) 0.7, Neutrophils (%) (Auto) 71.3H, Lymphocytes (%)

(Auto) 19.8L, Monocytes (%) (Auto) 7.5, Eosinophils (%) (Auto) 0.5, Basophils 

(%) (Auto) 0.2, Neutrophils # (Auto) 9.1H, Lymphocytes # (Auto) 2.5, Monocytes #

(Auto) 1.0H, Eosinophils # (Auto) 0.1, Basophils # (Auto) 0.0, Nucleated Red 

Blood Cells % (auto) 0.0, Blood Gas Bicarbonate Standard 27.4, Venous Blood pH 

7.347, Venous Blood Partial Pressure CO2 59.5H, Venous Blood Partial Pressure O2

35.6, Venous Blood Total Carbon Dioxide 33.7H, Venous Blood HCO3 31.9H, Venous 

Blood Oxygen Saturation 64.0, Venous Blood Base Excess 4.3H, Lactic Acid Level 

2.3*H


CBC/BMP


Laboratory Tests


10/6/21 18:05








10/6/21 18:06








Microbiology





Microbiology


10/6/21 Blood Culture, Received


          Pending


10/6/21 Blood Culture, Received


          Pending


10/6/21 Respiratory Virus Panel (PCR) ([facility]) - Final, Complete





Home Medications


Scheduled


Cholecalciferol (Vitamin D3) (Vitamin D3) 50 Mcg Capsule, 50 MCG PO DAILY





Scheduled PRN


Acetaminophen (Tylenol Extra Strength) 500 Mg Tablet, 1,000 MG PO Q6H PRN for 

HEADACHE OR MILD PAIN





Allergies


Coded Allergies:  


     No Known Allergies (Unverified , 5/26/21)





GME ATTESTATION


GME ATTESTATION


My faculty preceptor for this patient encounter was physically present during 

the encounter and was fully available. All aspects of the patient interview, 

examination, medical decision making process, and medical care plan development 

were reviewed and approved by the faculty preceptor. The faculty preceptor is 

aware and concurs with the plan as stated in the body of this note and will 

attest to such by his/her cosignature.





ATTENDING NOTE


Time of service 840pm





Mr. Randolph is a 51 yr old M admitted for:


#Sepsis 2/2 CAP


# Hypoxemia 2/2 RLL PNA





rest per 's H&P











SASHA SEGAL D.O.            Oct 6, 2021 22:06


ANABELL OSBORN MD                 Oct 7, 2021 03:28

## 2021-10-06 NOTE — REPVR
PROCEDURE INFORMATION: 

Exam: CTA Chest With Contrast 

Exam date and time: 10/6/2021 7:46 PM 

Age: 51 years old 

Clinical indication: Other: Hypoxia 



TECHNIQUE: 

Imaging protocol: Computed tomographic angiography of the chest with contrast. 

3D rendering (Not supervised by radiologist): MIP and/or 3D reconstructed 

images were created by the technologist. 

Radiation optimization: All CT scans at this facility use at least one of these 

dose optimization techniques: automated exposure control; mA and/or kV 

adjustment per patient size (includes targeted exams where dose is matched to 

clinical indication); or iterative reconstruction. 

Contrast material: ISOVUE 370; Contrast volume: 75 ml; Contrast route: 

INTRAVENOUS (IV);  



COMPARISON: 

CT Chest without contrast 5/26/2021 9:42 AM 



FINDINGS: 

Pulmonary arteries: Normal. No pulmonary emboli. 

Aorta: Unremarkable. No aortic aneurysm. No aortic dissection. 



Lungs: Atelectasis at bilateral lung bases. Bilateral paraseptal emphysema, 

right greater than left. 

Pleural spaces: Unremarkable. No pneumothorax. No pleural effusion. 

Heart: Unremarkable. No cardiomegaly. No pericardial effusion. 

Lymph nodes: Unremarkable. No enlarged lymph nodes. 



Liver: Multiple cystic lesions in the liver with the largest measuring 2.6 cm 

in the right hepatic lobe. 

Bones/joints: Unremarkable. No acute fracture. 

Soft tissues: Unremarkable. 



IMPRESSION: 

1. Atelectasis at bilateral lung bases. 

2. No pulmonary embolism. 



Electronically signed by: Arturo Clark On 10/06/2021  21:45:58 PM

## 2021-10-06 NOTE — REP
INDICATION:

DYSPNEA/COUGH.



COMPARISON:

05/26/2021 a two view exam



TECHNIQUE:

Portable



FINDINGS:

The technique utilized in obtaining the radiograph has magnified the cardiac

silhouette and accentuated the interstitial markings.



The cardiomediastinal silhouette is stable.  Heart is not enlarged.  Discoid opacity

left lower lobe retrocardiac region.  The lung fields are hypoexpanded.  The pleural

angles are sharp.  The osseous structures are within normal limits.





IMPRESSION:

Right lower lobe opacity subsegmental atelectatic change versus developing pneumonia.





<Electronically signed by Armaan Bronson > 10/06/21 [id number]

## 2021-10-07 VITALS — DIASTOLIC BLOOD PRESSURE: 71 MMHG | SYSTOLIC BLOOD PRESSURE: 112 MMHG

## 2021-10-07 VITALS — DIASTOLIC BLOOD PRESSURE: 72 MMHG | SYSTOLIC BLOOD PRESSURE: 111 MMHG

## 2021-10-07 VITALS — SYSTOLIC BLOOD PRESSURE: 112 MMHG | DIASTOLIC BLOOD PRESSURE: 72 MMHG

## 2021-10-07 VITALS — OXYGEN SATURATION: 97 %

## 2021-10-07 VITALS — OXYGEN SATURATION: 94 %

## 2021-10-07 LAB
BUN SERPL-MCNC: 3 MG/DL (ref 7–18)
CALCIUM SERPL-MCNC: 8.8 MG/DL (ref 8.5–10.1)
CHLORIDE SERPL-SCNC: 105 MEQ/L (ref 98–107)
CO2 SERPL-SCNC: 31 MEQ/L (ref 21–32)
CREAT SERPL-MCNC: 0.52 MG/DL (ref 0.7–1.3)
GFR SERPL CREATININE-BSD FRML MDRD: > 60 ML/MIN/{1.73_M2} (ref 56–?)
GLUCOSE SERPL-MCNC: 72 MG/DL (ref 70–100)
HCT VFR BLD AUTO: 42.6 % (ref 42–52)
HGB BLD-MCNC: 13.8 G/DL (ref 13.5–17.5)
MAGNESIUM SERPL-MCNC: 1.9 MG/DL (ref 1.8–2.4)
MCH RBC QN AUTO: 29.9 PG (ref 27–33)
MCHC RBC AUTO-ENTMCNC: 32.4 G/DL (ref 32–36.5)
MCV RBC AUTO: 92.4 FL (ref 80–96)
PLATELET # BLD AUTO: 305 10^3/UL (ref 150–450)
POTASSIUM SERPL-SCNC: 3.7 MEQ/L (ref 3.5–5.1)
RBC # BLD AUTO: 4.61 10^6/UL (ref 4.3–6.1)
SODIUM SERPL-SCNC: 140 MEQ/L (ref 136–145)
WBC # BLD AUTO: 14.7 10^3/UL (ref 4–10)

## 2021-10-07 RX ADMIN — DEXTROSE MONOHYDRATE SCH MLS/HR: 5 INJECTION INTRAVENOUS at 11:40

## 2021-10-07 RX ADMIN — IPRATROPIUM BROMIDE AND ALBUTEROL SULFATE SCH ML: .5; 3 SOLUTION RESPIRATORY (INHALATION) at 15:40

## 2021-10-07 RX ADMIN — IPRATROPIUM BROMIDE AND ALBUTEROL SULFATE SCH ML: .5; 3 SOLUTION RESPIRATORY (INHALATION) at 08:00

## 2021-10-07 RX ADMIN — IPRATROPIUM BROMIDE AND ALBUTEROL SULFATE SCH ML: .5; 3 SOLUTION RESPIRATORY (INHALATION) at 11:15

## 2021-10-07 RX ADMIN — DEXTROSE MONOHYDRATE SCH MLS/HR: 5 INJECTION INTRAVENOUS at 23:29

## 2021-10-07 RX ADMIN — ENOXAPARIN SODIUM SCH MG: 40 INJECTION SUBCUTANEOUS at 08:54

## 2021-10-07 NOTE — IPNPDOC
Subjective


Date Seen


The patient was seen on 10/7/21.





Subjective


Chief Complaint/HPI


feeling much better this morning. cough is less, SOB is better.





Objective


Physical Examination


General Exam:  Positive: Alert, Cooperative, No Acute Distress


Eye Exam:  Positive: PERRLA, Conjunctiva & lids normal, EOMI; 


   Negative: Sclera icteric


Chest Exam:  Positive: Rales, Rhonchi, Other (Bilateral coarse breath sounds and

crackles at the left base)


Heart Exam:  Positive: Rate Normal, Regular Rhythm, Normal S1, Normal S2; 


   Negative: Gallops, Murmurs, Rubs


Abdomen Exam:  Positive: Normal bowel sounds, Soft; 


   Negative: Tenderness


Extremity Exam:  Negative: Clubbing, Cyanosis, Edema


Psych Exam:  Positive: Memory Intact, Oriented x 3





Assessment /Plan


Assessment


This is 50yo male w/ notable PMHx of bradycardia/heart block s/p pacemaker 

(2018), muscular dystrophy (uses cane for ambulation, follows w/ neurology in 

[place]), asthma who presented to the [facility] ED on 10/6/21 with a chief complaint 

of intermittent episodes of shortness of breath and lightheadedness for about 10

days.  About 48 hours prior to admission the patient's symptoms had 

significantly improved and he was feeling much better.  His appetite was 

improving and he was in the process of making a meal in the afternoon of the day

of admission when he became acutely short of breath, "gasping for air."  He was 

breathing rapidly and bringing up yellow phlegm.  So presented to the ED. In the

ED, the patient was found to be hypoxic (initial SPO2 83%) and started on nasal 

cannula supplemental oxygen.  He was also significantly tachypneic and 

tachycardic.  Labs showed leukocytosis with left shift, lactic acid of 2.3.  

Negative respiratory viral panel.  Chest x-ray and CT chest showed a right lower

lobe developing pneumonia versus atelectasis.  CTA was negative for PE.  CT 

chest did also show emphysema.  Patient was admitted for pneumonia.





Pneumonia with acute hypoxic respiratory failure


We will continue with ceftriaxone and doxycycline


Continue oxygen supplement


Incentive spirometer





Emphysema and CT chest/asthma


We will place the patient on DuoNebs every 6 hours





Plan/VTE


VTE Prophylaxis Ordered?:  Yes





VS, I&O, 24H, Fishbone


Vital Signs/I&O





Vital Signs








  Date Time  Temp Pulse Resp B/P (MAP) Pulse Ox O2 Delivery O2 Flow Rate FiO2


 


10/7/21 10:08     97 Nasal Cannula 3.0 


 


10/7/21 06:00 97.1 78 16 112/71 (85)    














I&O- Last 24 Hours up to 6 AM 


 


 10/7/21





 05:59


 


Intake Total 150 ml


 


Output Total 300 ml


 


Balance -150 ml











Laboratory Data


24H LABS


Laboratory Tests 2


10/6/21 18:05: 


Prothrombin Time 15.5H, Prothromb Time International Ratio 1.18, Activated 

Partial Thromboplast Time 33.1, Anion Gap 4L, Glomerular Filtration Rate > 60.0,

Calcium Level 9.6, Total Bilirubin 0.4, Direct Bilirubin 0.2, Aspartate Amino 

Transf (AST/SGOT) 20, Alanine Aminotransferase (ALT/SGPT) 16, Alkaline 

Phosphatase 79, Total Creatine Kinase 199, Creatine Kinase MB 3.1, Creatine Elaine

se MB Relative Index 1.56, Troponin I < 0.02, NT-Pro-B-Type Natriuretic Peptide 

268H, Total Protein 8.1, Albumin 3.0L, Albumin/Globulin Ratio 0.6, Procalcitonin

0.08, Thyroid Stimulating Hormone (TSH) 1.430, Thyroxine (T4) 8.9


10/6/21 18:06: 


Immature Granulocyte % (Auto) 0.7, Neutrophils (%) (Auto) 71.3H, Lymphocytes (%)

(Auto) 19.8L, Monocytes (%) (Auto) 7.5, Eosinophils (%) (Auto) 0.5, Basophils 

(%) (Auto) 0.2, Neutrophils # (Auto) 9.1H, Lymphocytes # (Auto) 2.5, Monocytes #

(Auto) 1.0H, Eosinophils # (Auto) 0.1, Basophils # (Auto) 0.0, Nucleated Red 

Blood Cells % (auto) 0.0, Blood Gas Bicarbonate Standard 27.4, Venous Blood pH 

7.347, Venous Blood Partial Pressure CO2 59.5H, Venous Blood Partial Pressure O2

35.6, Venous Blood Total Carbon Dioxide 33.7H, Venous Blood HCO3 31.9H, Venous 

Blood Oxygen Saturation 64.0, Venous Blood Base Excess 4.3H, Lactic Acid Level 2

.3*H


10/6/21 22:51: Lactic Acid Followup at 4 Hours 2.0


10/6/21 23:13: 


10/7/21 05:21: 


Nucleated Red Blood Cells % (auto) 0.0, Anion Gap 4L, Glomerular Filtration Rate

> 60.0, Calcium Level 8.8, Magnesium Level 1.9


CBC/BMP


Laboratory Tests


10/6/21 18:05








10/6/21 18:06








10/7/21 05:21








Microbiology





Microbiology


10/6/21 Blood Culture, Received


          Pending


10/6/21 Blood Culture, Received


          Pending


10/6/21 Respiratory Virus Panel (PCR) ([facility]) - Final, Complete











Gabriela Rouse MD                    Oct 7, 2021 10:37

## 2021-10-07 NOTE — ECGEPIP
[facility] - ED

                                       

                                       Test Date:    2021-10-06

Pat Name:     ESTEFANÍA LECHUGA         Department:   

Patient ID:   [id number]                 Room:         -

Gender:       Male                     Technician:   LARISA

:          1970               Requested By: RAVINDRA GLOVER

Order Number: [id number]     Reading MD:   Yury Guerrero

                                 Measurements

Intervals                              Axis          

Rate:         87                       P:            93

OK:           240                      QRS:          -60

QRSD:         164                      T:            102

QT:           430                                    

QTc:          517                                    

                           Interpretive Statements

Atrial-sensed ventricular-paced rhythm with prolonged AV conduction

SIMILAR TO 21

Electronically Signed on 10-7-2021 4:19:03 EDT by Yury Guerrero

## 2021-10-08 VITALS — DIASTOLIC BLOOD PRESSURE: 72 MMHG | SYSTOLIC BLOOD PRESSURE: 109 MMHG

## 2021-10-08 LAB
BASOPHILS # BLD AUTO: 0 10^3/UL (ref 0–0.2)
BASOPHILS NFR BLD AUTO: 0.3 % (ref 0–1)
BUN SERPL-MCNC: 7 MG/DL (ref 7–18)
CALCIUM SERPL-MCNC: 9.3 MG/DL (ref 8.5–10.1)
CHLORIDE SERPL-SCNC: 108 MEQ/L (ref 98–107)
CO2 SERPL-SCNC: 27 MEQ/L (ref 21–32)
CREAT SERPL-MCNC: 0.51 MG/DL (ref 0.7–1.3)
EOSINOPHIL # BLD AUTO: 0.2 10^3/UL (ref 0–0.5)
EOSINOPHIL NFR BLD AUTO: 1.9 % (ref 0–3)
GFR SERPL CREATININE-BSD FRML MDRD: > 60 ML/MIN/{1.73_M2} (ref 56–?)
GLUCOSE SERPL-MCNC: 87 MG/DL (ref 70–100)
HCT VFR BLD AUTO: 44.9 % (ref 42–52)
HGB BLD-MCNC: 14.1 G/DL (ref 13.5–17.5)
LYMPHOCYTES # BLD AUTO: 2.7 10^3/UL (ref 1.5–5)
LYMPHOCYTES NFR BLD AUTO: 23.8 % (ref 24–44)
MCH RBC QN AUTO: 29.5 PG (ref 27–33)
MCHC RBC AUTO-ENTMCNC: 31.4 G/DL (ref 32–36.5)
MCV RBC AUTO: 93.9 FL (ref 80–96)
MONOCYTES # BLD AUTO: 0.8 10^3/UL (ref 0–0.8)
MONOCYTES NFR BLD AUTO: 7.3 % (ref 2–8)
NEUTROPHILS # BLD AUTO: 7.4 10^3/UL (ref 1.5–8.5)
NEUTROPHILS NFR BLD AUTO: 65.6 % (ref 36–66)
PLATELET # BLD AUTO: 302 10^3/UL (ref 150–450)
POTASSIUM SERPL-SCNC: 4.3 MEQ/L (ref 3.5–5.1)
RBC # BLD AUTO: 4.78 10^6/UL (ref 4.3–6.1)
SODIUM SERPL-SCNC: 140 MEQ/L (ref 136–145)
WBC # BLD AUTO: 11.2 10^3/UL (ref 4–10)

## 2021-10-08 RX ADMIN — DEXTROSE MONOHYDRATE SCH MLS/HR: 5 INJECTION INTRAVENOUS at 11:27

## 2021-10-08 RX ADMIN — ENOXAPARIN SODIUM SCH MG: 40 INJECTION SUBCUTANEOUS at 08:05

## 2021-10-08 RX ADMIN — IPRATROPIUM BROMIDE AND ALBUTEROL SULFATE SCH ML: .5; 3 SOLUTION RESPIRATORY (INHALATION) at 02:00

## 2021-10-08 RX ADMIN — IPRATROPIUM BROMIDE AND ALBUTEROL SULFATE SCH ML: .5; 3 SOLUTION RESPIRATORY (INHALATION) at 07:18

## 2021-10-08 RX ADMIN — IPRATROPIUM BROMIDE AND ALBUTEROL SULFATE SCH ML: .5; 3 SOLUTION RESPIRATORY (INHALATION) at 13:11

## 2021-10-09 NOTE — DS.PDOC
Discharge Summary


General


Date of Admission


Oct 6, 2021 at 16:57


Date of Discharge


10/8/21





Discharge Summary


PROCEDURES PERFORMED DURING STAY: [None].





DISCHARGE DIAGNOSES:


Community acquired pneumonia


Acute respiratory failure


Emphysema


Muscular dystrophy


Heart block s/p pacemaker in 2018





COMPLICATIONS/CHIEF COMPLAINT: Pneumonia,Sepsis.





HOSPITAL COURSE: This is 52yo male w/ notable PMHx of bradycardia/heart block 

s/p pacemaker (2018), muscular dystrophy (uses cane for ambulation, follows w/ 

neurology in [place]), asthma who presented to the [facility] ED on 10/6/21 with a 

chief complaint of intermittent episodes of shortness of breath and 

lightheadedness for about 10 days.  About 48 hours prior to admission the 

patient's symptoms had significantly improved and he was feeling much better.  

His appetite was improving and he was in the process of making a meal in the 

afternoon of the day of admission when he became acutely short of breath, 

"gasping for air."  He was breathing rapidly and bringing up yellow phlegm.  So 

presented to the ED. In the ED, the patient was found to be hypoxic (initial 

SPO2 83%) and started on nasal cannula supplemental oxygen.  He was also 

significantly tachypneic and tachycardic.  Labs showed leukocytosis with left 

shift, lactic acid of 2.3.  Negative respiratory viral panel.  Chest x-ray and 

CT chest showed a right lower lobe developing pneumonia versus atelectasis.  CTA

 was negative for PE.  CT chest did also show emphysema.  Patient was admitted 

for pneumonia.





Pneumonia with acute hypoxic respiratory failure


resp failure resolved now in room air


Mycoplasma IgM not elevated. Legionella and chlamydia pending


continue cefdinir and doxycycline on discharge. 





Emphysema and CT chest/asthma


combivent





DISCHARGE MEDICATIONS: Please see below.


 


ALLERGIES: Please see below.





PHYSICAL EXAMINATION ON DISCHARGE:


VITAL SIGNS: Please see below.


General Exam:  Positive: Alert, Cooperative, No Acute Distress


Eye Exam:  Positive: PERRLA, Conjunctiva & lids normal, EOMI; 


   Negative: Sclera icteric


Chest Exam:  Positive: Rales, Rhonchi, Other (Bilateral coarse breath sounds and

 crackles at the left base)


Heart Exam:  Positive: Rate Normal, Regular Rhythm, Normal S1, Normal S2; 


   Negative: Gallops, Murmurs, Rubs


Abdomen Exam:  Positive: Normal bowel sounds, Soft; 


   Negative: Tenderness


Extremity Exam:  Negative: Clubbing, Cyanosis, Edema


Psych Exam:  Positive: Memory Intact, Oriented x 3





LABORATORY DATA: Please see below.





ACTIVITY: [As tolerated].





DIET: As tolerated





DISPOSITION: 01 Home, Self-Care.





DISCHARGE INSTRUCTIONS:


PMD in 1 week


Follow up urine legionella antigen, clamydia, urine strep pneumoniae antigen. 





DISCHARGE CONDITION: [Stable].





TIME SPENT ON DISCHARGE: 35 minutes.





Vital Signs/I&Os





Vital Signs








  Date Time  Temp Pulse Resp B/P (MAP) Pulse Ox O2 Delivery O2 Flow Rate FiO2


 


10/8/21 12:04     94 Room Air  


 


10/8/21 09:15       2.0 


 


10/8/21 06:00 98.2 77 14 109/72 (84)    














I&O- Last 24 Hours up to 6 AM 


 


 10/9/21





 07:00


 


Intake Total 300 ml


 


Output Total 350 ml


 


Balance -50 ml











Microbiology





Microbiology


10/7/21 Gram Stain - Final, Resulted


          


10/7/21 Sputum Culture, Resulted


          Pending


10/6/21 Blood Culture - Preliminary, Resulted


          No Growth after 72 hours. All specime...


10/6/21 Blood Culture - Preliminary, Resulted


          No Growth after 72 hours. All specime...


10/6/21 Respiratory Virus Panel (PCR) (RICHY) - Final, Complete





Discharge Medications


Scheduled


Cefdinir (Cefdinir) 300 Mg Capsule, 300 MG PO BID


Cholecalciferol (Vitamin D3) (Vitamin D3) 50 Mcg Capsule, 50 MCG PO DAILY, 

(Reported)


Doxycycline Hyclate (Doxycycline Hyclate) 100 Mg Tablet, 1 TAB PO BID


Ipratropium/Albuterol Sulfate (Combivent Respimat  Mcg) 4 Gm Mist.inhal, 2

 PUFF INH TID





Scheduled PRN


Acetaminophen (Tylenol Extra Strength) 500 Mg Tablet, 1,000 MG PO Q6H PRN for 

HEADACHE OR MILD PAIN, (Reported)





Allergies


Coded Allergies:  


     No Known Allergies (Unverified , 5/26/21)











Gabriela Rouse MD                    Oct 9, 2021 21:10

## 2021-10-10 LAB
BACTERIA ID TEST ISLT QL CULT: (no result)
BACTERIA SPEC BFLD CULT: (no result)

## 2021-11-16 ENCOUNTER — HOSPITAL ENCOUNTER (INPATIENT)
Dept: HOSPITAL 53 - M ED | Age: 51
LOS: 2 days | Discharge: HOME | DRG: 189 | End: 2021-11-18
Attending: INTERNAL MEDICINE | Admitting: INTERNAL MEDICINE
Payer: OTHER GOVERNMENT

## 2021-11-16 VITALS — BODY MASS INDEX: 23.2 KG/M2 | HEIGHT: 72 IN | WEIGHT: 171.3 LBS

## 2021-11-16 DIAGNOSIS — Z95.0: ICD-10-CM

## 2021-11-16 DIAGNOSIS — I44.7: ICD-10-CM

## 2021-11-16 DIAGNOSIS — J18.9: ICD-10-CM

## 2021-11-16 DIAGNOSIS — R74.01: ICD-10-CM

## 2021-11-16 DIAGNOSIS — Z20.822: ICD-10-CM

## 2021-11-16 DIAGNOSIS — J96.01: Primary | ICD-10-CM

## 2021-11-16 DIAGNOSIS — J44.1: ICD-10-CM

## 2021-11-16 DIAGNOSIS — G71.00: ICD-10-CM

## 2021-11-16 LAB
ALBUMIN SERPL BCG-MCNC: 3.7 GM/DL (ref 3.2–5.2)
ALT SERPL W P-5'-P-CCNC: 20 U/L (ref 12–78)
BASE EXCESS BLDA CALC-SCNC: -1.3 MMOL/L (ref -2–2)
BASOPHILS # BLD AUTO: 0 10^3/UL (ref 0–0.2)
BASOPHILS NFR BLD AUTO: 0.3 % (ref 0–1)
BILIRUB CONJ SERPL-MCNC: 0.2 MG/DL (ref 0–0.2)
BILIRUB SERPL-MCNC: 0.8 MG/DL (ref 0.2–1)
BUN SERPL-MCNC: 7 MG/DL (ref 7–18)
CALCIUM SERPL-MCNC: 9.5 MG/DL (ref 8.5–10.1)
CHLORIDE SERPL-SCNC: 109 MEQ/L (ref 98–107)
CO2 BLDA CALC-SCNC: 25 MEQ/L (ref 22–29)
CO2 SERPL-SCNC: 28 MEQ/L (ref 21–32)
CREAT SERPL-MCNC: 0.65 MG/DL (ref 0.7–1.3)
EOSINOPHIL # BLD AUTO: 0.3 10^3/UL (ref 0–0.5)
EOSINOPHIL NFR BLD AUTO: 2.4 % (ref 0–3)
GFR SERPL CREATININE-BSD FRML MDRD: > 60 ML/MIN/{1.73_M2} (ref 56–?)
GLUCOSE SERPL-MCNC: 106 MG/DL (ref 70–100)
HCO3 BLDA-SCNC: 23.8 MEQ/L (ref 22–26)
HCO3 STD BLDA-SCNC: 23.3 MEQ/L (ref 22–26)
HCT VFR BLD AUTO: 46.6 % (ref 42–52)
HGB BLD-MCNC: 15.1 G/DL (ref 13.5–17.5)
LYMPHOCYTES # BLD AUTO: 1.7 10^3/UL (ref 1.5–5)
LYMPHOCYTES NFR BLD AUTO: 12.5 % (ref 24–44)
MCH RBC QN AUTO: 30.4 PG (ref 27–33)
MCHC RBC AUTO-ENTMCNC: 32.4 G/DL (ref 32–36.5)
MCV RBC AUTO: 94 FL (ref 80–96)
MONOCYTES # BLD AUTO: 1.2 10^3/UL (ref 0–0.8)
MONOCYTES NFR BLD AUTO: 8.9 % (ref 2–8)
NEUTROPHILS # BLD AUTO: 10.5 10^3/UL (ref 1.5–8.5)
NEUTROPHILS NFR BLD AUTO: 75.5 % (ref 36–66)
PCO2 BLDA: 41 MMHG (ref 35–45)
PH BLDA: 7.38 UNITS (ref 7.35–7.45)
PLATELET # BLD AUTO: 246 10^3/UL (ref 150–450)
PO2 BLDA: 61.4 MMHG (ref 75–100)
POTASSIUM SERPL-SCNC: 4 MEQ/L (ref 3.5–5.1)
PROT SERPL-MCNC: 7.7 GM/DL (ref 6.4–8.2)
RBC # BLD AUTO: 4.96 10^6/UL (ref 4.3–6.1)
RSV RNA NPH QL NAA+PROBE: NEGATIVE
SAO2 % BLDA: 91.6 % (ref 95–99)
SODIUM SERPL-SCNC: 142 MEQ/L (ref 136–145)
WBC # BLD AUTO: 13.9 10^3/UL (ref 4–10)

## 2021-11-16 RX ADMIN — DEXTROSE MONOHYDRATE SCH MLS/HR: 5 INJECTION INTRAVENOUS at 21:14

## 2021-11-16 RX ADMIN — FLUTICASONE PROPIONATE AND SALMETEROL XINAFOATE SCH PUFF: 230; 21 AEROSOL, METERED RESPIRATORY (INHALATION) at 20:51

## 2021-11-16 RX ADMIN — IPRATROPIUM BROMIDE AND ALBUTEROL SULFATE SCH ML: .5; 3 SOLUTION RESPIRATORY (INHALATION) at 20:00

## 2021-11-16 RX ADMIN — CEFTRIAXONE SCH MLS/HR: 1 INJECTION, POWDER, FOR SOLUTION INTRAMUSCULAR; INTRAVENOUS at 22:21

## 2021-11-16 NOTE — CCD
Summarization Of Episode

                             Created on: 2021



ESTEFANÍA LECHUGA

External Reference #: [id number]

: 1970

Sex: Undifferentiated



Demographics





                          Address                   [address] [place] [address]  [address]

 

                          Home Phone                [phone]

 

                          Preferred Language        English

 

                          Marital Status            Unknown

 

                          Buddhist Affiliation     Unknown

 

                          Race                      Unknown

 

                          Ethnic Group               or 





Author





                          Author                    [facility]

 

                          Organization              [facility]

 

                          Address                   Unknown

 

                          Phone                     Unavailable







Support





                Name            Relationship    Address         Phone

 

                    RITU PICHARDO      Next Of Kin         [address] [place]  [address]                    [phone]

 

                UE              Next Of Kin     Unknown         Unavailable

 

                    [facility] Next Of Kin         [address] [place]  [address]                    [phone]

 

                    KALEB LECHUGA   Next Of [place]         [address] 

[place]  [address]                    [phone]

 

                    ALEXANDREA  KALEB   [place]  [address]                    Unavailable







Care Team Providers





                    Care Team Member Name Role                Phone

 

                    Maring,  Chaz PA     Unavailable         Unavailable

 

                    Maring,  Chaz PA     Unavailable         Unavailable

 

                    Maring,  Chaz PA     Unavailable         Unavailable

 

                    Maring,  Chaz PA     Unavailable         Unavailable

 

                    Maring,  Chaz PA     Unavailable         Unavailable

 

                    Maring,  Chaz PA     Unavailable         Unavailable

 

                    Maring,  Chaz PA     Unavailable         Unavailable

 

                    Maring,  Chaz PA     Unavailable         Unavailable

 

                    Maring,  Chaz PA     Unavailable         Unavailable

 

                    Maring,  Chaz PA     Unavailable         Unavailable

 

                    Maring,  Chaz PA     Unavailable         Unavailable

 

                    Maring,  Chaz PA     Unavailable         Unavailable

 

                    Maring,  Chaz PA     Unavailable         Unavailable

 

                    Maring,  Chaz PA     Unavailable         Unavailable

 

                    Maring,  Chaz PA     Unavailable         Unavailable

 

                    Maring,  Chaz PA     Unavailable         Unavailable

 

                    Alcala, L Milly RPA Unavailable         Unavailable

 

                    Alcala, L Milly RPA Unavailable         Unavailable

 

                    Alcala, L Milly RPA Unavailable         Unavailable

 

                    Alcala, L Milly RPA Unavailable         Unavailable

 

                    Alcala, L Milly RPA Unavailable         Unavailable

 

                    Alcala, L Milly RPA Unavailable         Unavailable

 

                    Alcala, L Milly RPA Unavailable         Unavailable

 

                    Alcala, L Milly RPA Unavailable         Unavailable

 

                    Alcala, L Milly RPA Unavailable         Unavailable

 

                    Alcala, L Milly RPA Unavailable         Unavailable

 

                    Alcala, L Milly RPA Unavailable         Unavailable

 

                    Alcala, L Milly RPA Unavailable         Unavailable

 

                    Alcala, L Milly RPA Unavailable         Unavailable

 

                    Alcala, L Milly RPA Unavailable         Unavailable

 

                    Alcala, L Milly RPA Unavailable         Unavailable

 

                    Alcala, L Milly RPA Unavailable         Unavailable

 

                    Alcala, L Milly RPA Unavailable         Unavailable

 

                    Alcala, L Milly RPA Unavailable         Unavailable

 

                    Alcala, L Milly RPA Unavailable         Unavailable

 

                    Alcala, L Milly RPA Unavailable         Unavailable

 

                    Alcala, L Milly RPA Unavailable         Unavailable

 

                    Alcala, L Milly RPA Unavailable         Unavailable

 

                    Alcala, L Milly RPA Unavailable         Unavailable

 

                    Alcala, L Milly RPA Unavailable         Unavailable

 

                    Alcala, L Milly RPA Unavailable         Unavailable

 

                    Alcala, L Milly RPA Unavailable         Unavailable

 

                    Alcala, L Milly RPA Unavailable         Unavailable

 

                    Alcala, L Milly RPA Unavailable         Unavailable

 

                    Alcala, L Milly RPA Unavailable         Unavailable

 

                    Alcala, L Milly RPA Unavailable         Unavailable

 

                    Alcala, L Milly RPA Unavailable         Unavailable

 

                    Alcala, L Milly RPA Unavailable         Unavailable



                                  



Re-disclosure Warning

          The records that you are about to access may contain information from 
federally-assisted alcohol or drug abuse programs. If such information is 
present, then the following federally mandated warning applies: This information
has been disclosed to you from records protected by federal confidentiality 
rules (42 CFR part 2). The federal rules prohibit you from making any further 
disclosure of this information unless further disclosure is expressly permitted 
by the written consent of the person to whom it pertains or as otherwise 
permitted by 42 CFR part 2. A general authorization for the release of medical 
or other information is NOT sufficient for this purpose. The Federal rules 
restrict any use of the information to criminally investigate or prosecute any 
alcohol or drug abuse patient.The records that you are about to access may 
contain highly sensitive health information, the redisclosure of which is 
protected by Article 27-F of the [facility] Public Health law. If you 
continue you may have access to information: Regarding HIV / AIDS; Provided by 
facilities licensed or operated by the [facility] Office of Mental Health; 
or Provided by the [facility] Office for People With Developmental 
Disabilities. If such information is present, then the following [place] 
mandated warning applies: This information has been disclosed to you from 
confidential records which are protected by state law. State law prohibits you 
from making any further disclosure of this information without the specific 
written consent of the person to whom it pertains, or as otherwise permitted by 
law. Any unauthorized further disclosure in violation of state law may result in
a fine or group home sentence or both. A general authorization for the release of 
medical or other information is NOT sufficient authorization for further disc
losure.                                                                         
    



Family History

          



             Family Member Name Family Member Gender Family Member Status Date o

f Status 

Description                             Data Source(s)

 

           Unknown    Unknown    Problem                          [facility] ([facility]

[facility])



                                                                                
       



Encounters

          



           Encounter  Providers  Location   Date       Indications Data Source(s

)

 

                Outpatient      Attender: Chaz SOLANO                 20 10:03:18 AM EST - 2021 

11:53:47 AM EST                                     [facility] ([facility]

)

 

                Outpatient      Attender: Milly Alcala BONI Reyes/Luis/Carlos/NE rush 10/13/2021 

09:00:00 AM EDT                                     [facility] ([facility]

actice, )



                                                                                
                 



Medications

          No Information                                                        
 



Insurance Providers

          



             Payer name   Policy type / Coverage type Policy ID    Covered party

 ID Covered 

party's relationship to tapia Policy Tapia             Plan Information

 

          [employer] [facility] [facility]           [id number]           SP           

       [id number]

 

          SELF PAY ONLY           [id number]           SP                  [id number]

218

 

          [facility]       I         [id number]           Self                [id number]

 

          [employer] [place] [employer] Plan/ [id number]           Self      

          [id number]

 

          [employer] [place] [employer] Plan/ [id number]           Self      

          [id number]

 

          MEDICARE            [id number]           SP                  [id number]

V81

 

          [employer] CCN           [id number]           SP                  [id number]

[id number]

 

          [employer]           [id number]           SP             

     [id number]

 

          [employer] COMMUNITY PLAN [facility] [place]           [id number]           SP           

       [id number]

 

                [employer]      [id number]       

[id number] Self                                    

[id number]

 

          SELF PAY ONLY           UNAVAILABLE                               UNAV

AILABLE

 

                    O         UNAVAILABLE                               UNAVAILA

BLE

 

          Self Pay  Commercial           [id number] 

Self                 

 

          SELF PAY            UNAVAILABLE           SP                  UNAVAILA

BLE



                                                                                
                                                                                
                                                        



Problems, Conditions, and Diagnoses

          No Information                                                        
                               



Surgeries/Procedures

          



             Procedure    Description  Date         Indications  Data Source(s)

 

             OFFICE OUTPATIENT NEW 30 MINUTES              10/13/2021 12:00:00 A

M EDT              [facility] 

([facility], )



                                                                                
       



Results

          



                    ID                  Date                Data Source

 

                    [id number]            10/06/2021 05:24:00 PM EDT [facility]









          Name      Value     Range     Interpretation Code Description Data Buffy

rce(s) Supporting 

Document(s)

 

          SARS-CoV-2 (COVID 19) NEGATIVE - SARS-CoV-2 (COVID19)                 

              [facility]     

 

                                        This lab was ordered by [facility] a

nd reported by [facility].











                    ID                  Date                Data Source

 

                    [id number]             2021 07:53:00 AM EDT [facility]









          Name      Value     Range     Interpretation Code Description Data Buffy

rce(s) Supporting 

Document(s)

 

          SARS-CoV-2 (COVID 19) NEGATIVE - SARS-CoV-2 (COVID19)                 

              [facility]     

 

                                        This lab was ordered by [facility] a

nd reported by [facility].









                                        Procedure

 

                                          



                                                                                
                           



Social History

          No Information                                                        
                               



Vital Signs

          



                    ID                  Date                Data Source

 

                    UNK                                      









           Name       Value      Range      Interpretation Code Description Data

 Source(s)

 

           Systolic blood pressure 93 mm[Hg]                        93 mm[Hg]  M

[facility] ([facility])

 

           Diastolic blood pressure 66 mm[Hg]                        66 mm[Hg]  

[facility] ([facility])

 

           Heart rate 82 /min                          82 /min    [facility] ([facility])

 

           Body temperature 97.1 [degF]                       97.1 [degF] [facility]

 ([facility])

 

           Body height 72 [in_i]                        72 [in_i]  [facility] ([facility])

 

                                        6'0" 

 

           Body weight 161.38 [lb_av]                       161.38 [facility]

[facility] ([facility]

 

           Body mass index (BMI) [Ratio] 21.9 kg/m2                       21.9 k

g/m2 [facility] ([facility])

 

           Ideal body weight 178 [lb_av]                       178 [facility]lb_av] [facility]

T ([facility])

 

           Body weight 73.200 kg                        73.200 kg  [facility] ([facility])

 

           Body surface area Derived from formula 1.94 m2                       

   1.94 m2    [facility] ([facility])

## 2021-11-16 NOTE — ECGEPIP
[facility] - ED

                                       

                                       Test Date:    2021

Pat Name:     ESTEFANÍA LECHUGA         Department:   

Patient ID:   [id number]                 Room:         -

Gender:       Male                     Technician:   jaky

:          1970               Requested By: RAVINDRA GLOVER

Order Number: [id number]     Reading MD:   Yury Guerrero

                                 Measurements

Intervals                              Axis          

Rate:         93                       P:            81

OH:           200                      QRS:          -61

QRSD:         182                      T:            101

QT:           440                                    

QTc:          547                                    

                           Interpretive Statements

Atrial-sensed ventricular-paced rhythm

SIMILAR TO 10/6/21

Electronically Signed on 2021 18:13:35 EST by Yury Guerrero

## 2021-11-16 NOTE — CCD
Summarization Of Episode

                             Created on: 2021



ESTEFANÍA LECHUGA

External Reference #: [id number]

: 1970

Sex: Undifferentiated



Demographics





                          Address                   [address] [place]  [address]

 

                          Home Phone                [phone]

 

                          Preferred Language        English

 

                          Marital Status            Unknown

 

                          Worship Affiliation     Unknown

 

                          Race                      Unknown

 

                          Ethnic Group               or 





Author





                          Author                    [facility]

 

                          Organization              [facility]

 

                          Address                   Unknown

 

                          Phone                     Unavailable







Support





                Name            Relationship    Address         Phone

 

                    RITU PICHARDO      Next Of Kin         [address] [place]  [address]                    [phone]

 

                UE              Next Of Kin     Unknown         Unavailable

 

                    [facility] Next Of Kin         [address] [place]  [address]                    [phone]

 

                    LECHUGA,  KALEB   Next Of Kin         [address] 

[place]  [address]                    [phone]

 

                    LECHUGA,  KALEB   ECON                [address] 

[place]  [address]                    Unavailable







Care Team Providers





                    Care Team Member Name Role                Phone

 

                    Alcala, L Milly RPA Unavailable         Unavailable

 

                    Alcala, L Milly RPA Unavailable         Unavailable

 

                    Alcala, L Milly RPA Unavailable         Unavailable

 

                    Alcala, L Milly RPA Unavailable         Unavailable

 

                    Alcala, L Milly RPA Unavailable         Unavailable

 

                    Alcala, L Milly RPA Unavailable         Unavailable

 

                    Alcala, L Milly RPA Unavailable         Unavailable

 

                    Alcala, L Milly RPA Unavailable         Unavailable

 

                    Alcala, L Milly RPA Unavailable         Unavailable

 

                    Alcala, L Milly RPA Unavailable         Unavailable

 

                    Alcala, L Milly RPA Unavailable         Unavailable

 

                    Alcala, L Milly RPA Unavailable         Unavailable

 

                    Alcala, L Milly RPA Unavailable         Unavailable

 

                    Alcala, L Milly RPA Unavailable         Unavailable

 

                    Alcala, L Milly RPA Unavailable         Unavailable

 

                    Alcala, L Milly RPA Unavailable         Unavailable

 

                    Alcala, L Milly RPA Unavailable         Unavailable

 

                    Alcala, L Milly RPA Unavailable         Unavailable

 

                    Alcala, L Milly RPA Unavailable         Unavailable

 

                    Alcala, L Milly RPA Unavailable         Unavailable

 

                    Alcala, L Milly RPA Unavailable         Unavailable

 

                    Alcala, L Milly RPA Unavailable         Unavailable

 

                    Alcala, L Milly RPA Unavailable         Unavailable

 

                    Alcala, L Milly RPA Unavailable         Unavailable

 

                    Alcala, L Milly RPA Unavailable         Unavailable

 

                    Alcala, L Milly RPA Unavailable         Unavailable

 

                    Alcala, L Milly RPA Unavailable         Unavailable

 

                    Alcala, L Milly RPA Unavailable         Unavailable

 

                    Alcala, L Milly RPA Unavailable         Unavailable

 

                    Alcala, L Milly RPA Unavailable         Unavailable

 

                    Alcala, L Milly RPA Unavailable         Unavailable

 

                    Alcala, L Milly RPA Unavailable         Unavailable



                                  



Re-disclosure Warning

          The records that you are about to access may contain information from 
federally-assisted alcohol or drug abuse programs. If such information is 
present, then the following federally mandated warning applies: This information
has been disclosed to you from records protected by federal confidentiality 
rules (42 CFR part 2). The federal rules prohibit you from making any further 
disclosure of this information unless further disclosure is expressly permitted 
by the written consent of the person to whom it pertains or as otherwise 
permitted by 42 CFR part 2. A general authorization for the release of medical 
or other information is NOT sufficient for this purpose. The Federal rules 
restrict any use of the information to criminally investigate or prosecute any 
alcohol or drug abuse patient.The records that you are about to access may 
contain highly sensitive health information, the redisclosure of which is 
protected by Article 27-F of the [facility] Public Health law. If you 
continue you may have access to information: Regarding HIV / AIDS; Provided by 
facilities licensed or operated by the [facility] Office of Mental Health; 
or Provided by the [facility] Office for People With Developmental 
Disabilities. If such information is present, then the following [place] 
mandated warning applies: This information has been disclosed to you from 
confidential records which are protected by state law. State law prohibits you 
from making any further disclosure of this information without the specific 
written consent of the person to whom it pertains, or as otherwise permitted by 
law. Any unauthorized further disclosure in violation of state law may result in
a fine or nursing home sentence or both. A general authorization for the release of 
medical or other information is NOT sufficient authorization for further disc
losure.                                                                         
    



Family History

          



             Family Member Name Family Member Gender Family Member Status Date o

f Status 

Description                             Data Source(s)

 

           Unknown    Unknown    Problem                          [facility] ([facility])



                                                                                
       



Encounters

          



           Encounter  Providers  Location   Date       Indications Data Source(s

)

 

                Outpatient      Attender: Milly Reyes/Luis/Carlos/NE rush 10/13/2021 

09:00:00 AM EDT                                     [facility]



                                                                                
       



Medications

          No Information                                                        
 



Insurance Providers

          



             Payer name   Policy type / Coverage type Policy ID    Covered party

 ID Covered 

party's relationship to tapia Policy Tapia             Plan Information

 

          SELF PAY ONLY           [id number]           SP                  [id number]

[id number]

 

          [employer]           [id number]           SP           

       [id number]

 

          [facility]       I         [id number]           Self                [id number]

 

          [employer]           [id number]           SP           

       [id number]

 

                's Administration Commercial      [id number]       

[id number] Self                                    

[id number]

 

          SELF PAY ONLY           UNAVAILABLE                               UNAV

AILABLE

 

                    O         UNAVAILABLE                               UNAVAILA

BLE

 

          Self Pay  Commercial           [id number] 

Self                 

 

          [employer]           [id number]           SP             

     [id number]

 

          SELF PAY            UNAVAILABLE           SP                  UNAVAILA

BLE

 

          [employer] [facility]           [id number]           SP                  [id number]

[id number]

 

          MEDICARE            [id number]           SP                  [id number]

[id number]



                                                                                
                                                                                
                                    



Problems, Conditions, and Diagnoses

          No Information                                                        
                               



Surgeries/Procedures

          



             Procedure    Description  Date         Indications  Data Source(s)

 

             OFFICE OUTPATIENT NEW 30 MINUTES              10/13/2021 12:00:00 A

M EDT              [facility] 

[facility]



                                                                                
       



Results

          



                    ID                  Date                Data Source

 

                    [id number]            10[facility]06/2021 05:24:[facility]









          Name      Value     Range     Interpretation Code Description Data Buffy

rce(s) Supporting 

Document(s)

 

          SARS-CoV-2 (COVID 19) NEGATIVE - SARS-CoV-2 (COVID19)                 

              [facility]     

 

                                        This lab was ordered by [facility] a

nd reported by [facility].











                    ID                  Date                Data Source

 

                    [id number]             2021 07:53:00 AM EDT [facility]









          Name      Value     Range     Interpretation Code Description Data Buffy

rce(s) Supporting 

Document(s)

 

          SARS-CoV-2 (COVID 19) NEGATIVE - SARS-CoV-2 (COVID19)                 

              [facility]     

 

                                        This lab was ordered by [facility] a

nd reported by [facility].









                                        Procedure

 

                                          



                                                                                
                           



Social History

          No Information                                                        
                               



Vital Signs

          



                    ID                  Date                Data Source

 

                    UNK                                      









           Name       Value      Range      Interpretation Code Description Data

 Source(s)

 

           Systolic blood pressure 93 mm[Hg]                        93 mm[Hg]  [facility]

 

           Diastolic blood pressure 66 mm[Hg]                        66 mm[Hg]  

[facility] ([facility]

 

           Heart rate 82 /min                          82 /min    [facility] ([facility])

 

           Body temperature 97.1 [degF]                       97.1 [degF] [facility]

 ([facility])

 

           Body height 72 [in_i]                        72 [in_i]  [facility] ([facility])

 

                                        6'0" 

 

           Body weight 161.38 [lb_av]                       161.38 [facility]

 

           Body mass index (BMI) [Ratio] 21.9 kg/m2                       21.9 k

g/m2 [facility]

 

           Ideal body weight 178 [lb_av]                       178 [facility]_av] [facility]

[facility]

 

           Body weight 73.200 kg                        73.200 kg  [facility] ([facility])

 

           Body surface area Derived from formula 1.94 m2                       

   1.94 m2    [facility]

## 2021-11-16 NOTE — CCD
Summarization Of Episode

                             Created on: 2021



ESTEFANÍA LECHUGA

External Reference #: [id number]

: 1970

Sex: Undifferentiated



Demographics





                          Address                   [address] [place] [address]  [address]

 

                          Home Phone                [phone]

 

                          Preferred Language        English

 

                          Marital Status            Unknown

 

                          Yarsani Affiliation     Unknown

 

                          Race                      Unknown

 

                          Ethnic Group               or 





Author





                          Author                    [facility]

 

                          Organization              [facility]

 

                          Address                   Unknown

 

                          Phone                     Unavailable







Support





                Name            Relationship    Address         Phone

 

                    RITU PICHARDO      Next Of Kin         [address] [place]  [address]                    [phone]

 

                UE              Next Of Kin     Unknown         Unavailable

 

                    [facility] Next Of Kin         [address] [place]  [address]                    [phone]

 

                    KALEB LECHUGA   Next Of Kin         [address] 

[place]  [address]                    [phone]

 

                    ALEXANDREA  KALEB   EC[place]                [place]  [address]                    Unavailable







Care Team Providers





                    Care Team Member Name Role                Phone

 

                    Maring,  Chaz PA     Unavailable         Unavailable

 

                    Maring,  Chaz PA     Unavailable         Unavailable

 

                    Maring,  Chaz PA     Unavailable         Unavailable

 

                    Maring,  Chaz PA     Unavailable         Unavailable

 

                    Maring,  Chaz PA     Unavailable         Unavailable

 

                    Maring,  Chaz PA     Unavailable         Unavailable

 

                    Maring,  Chaz PA     Unavailable         Unavailable

 

                    Maring,  Chaz PA     Unavailable         Unavailable

 

                    Maring,  Chaz PA     Unavailable         Unavailable

 

                    Maring,  Chaz PA     Unavailable         Unavailable

 

                    Maring,  Chaz PA     Unavailable         Unavailable

 

                    Maring,  Chaz PA     Unavailable         Unavailable

 

                    Maring,  Chaz PA     Unavailable         Unavailable

 

                    Maring,  Chaz PA     Unavailable         Unavailable

 

                    Maring,  Chaz PA     Unavailable         Unavailable

 

                    Maring,  Chaz PA     Unavailable         Unavailable

 

                    Alcala, L Milly RPA Unavailable         Unavailable

 

                    Alcala, L Milly RPA Unavailable         Unavailable

 

                    Alcala, L Milly RPA Unavailable         Unavailable

 

                    Alcala, L Milly RPA Unavailable         Unavailable

 

                    Alcala, L Milly RPA Unavailable         Unavailable

 

                    Alcala, L Milly RPA Unavailable         Unavailable

 

                    Alcala, L Milly RPA Unavailable         Unavailable

 

                    Alcala, L Milly RPA Unavailable         Unavailable

 

                    Alcala, L Milly RPA Unavailable         Unavailable

 

                    Alcala, L Milly RPA Unavailable         Unavailable

 

                    Alcala, L Milly RPA Unavailable         Unavailable

 

                    Alcala, L Milly RPA Unavailable         Unavailable

 

                    Alcala, L Milly RPA Unavailable         Unavailable

 

                    Alcala, L Milly RPA Unavailable         Unavailable

 

                    Alcala, L Milly RPA Unavailable         Unavailable

 

                    Alcala, L Milly RPA Unavailable         Unavailable

 

                    Alcala, L Milly RPA Unavailable         Unavailable

 

                    Alclaa, L Milly RPA Unavailable         Unavailable

 

                    Alcala, L Milly RPA Unavailable         Unavailable

 

                    Alcala, L Milly RPA Unavailable         Unavailable

 

                    Alcala, L Milly RPA Unavailable         Unavailable

 

                    Alcala, L Milly RPA Unavailable         Unavailable

 

                    Alcala, L Milly RPA Unavailable         Unavailable

 

                    Alcala, L Milly RPA Unavailable         Unavailable

 

                    Alcala, L Milly RPA Unavailable         Unavailable

 

                    Alcala, L Milly RPA Unavailable         Unavailable

 

                    Alcala, L Milly RPA Unavailable         Unavailable

 

                    Alcala, L Milly RPA Unavailable         Unavailable

 

                    Alcala, L Milly RPA Unavailable         Unavailable

 

                    Alcala, L Milly RPA Unavailable         Unavailable

 

                    Alcala, L Milly RPA Unavailable         Unavailable

 

                    Alcala, L Milly RPA Unavailable         Unavailable



                                  



Re-disclosure Warning

          The records that you are about to access may contain information from 
federally-assisted alcohol or drug abuse programs. If such information is 
present, then the following federally mandated warning applies: This information
has been disclosed to you from records protected by federal confidentiality 
rules (42 CFR part 2). The federal rules prohibit you from making any further 
disclosure of this information unless further disclosure is expressly permitted 
by the written consent of the person to whom it pertains or as otherwise 
permitted by 42 CFR part 2. A general authorization for the release of medical 
or other information is NOT sufficient for this purpose. The Federal rules 
restrict any use of the information to criminally investigate or prosecute any 
alcohol or drug abuse patient.The records that you are about to access may 
contain highly sensitive health information, the redisclosure of which is 
protected by Article 27-F of the [facility] law. If you 
continue you may have access to information: Regarding HIV / AIDS; Provided by 
facilities licensed or operated by the [facility] Office of Mental Health; 
or Provided by the [facility] Office for People With Developmental 
Disabilities. If such information is present, then the following [place] 
mandated warning applies: This information has been disclosed to you from 
confidential records which are protected by state law. State law prohibits you 
from making any further disclosure of this information without the specific 
written consent of the person to whom it pertains, or as otherwise permitted by 
law. Any unauthorized further disclosure in violation of state law may result in
a fine or long-term sentence or both. A general authorization for the release of 
medical or other information is NOT sufficient authorization for further disc
losure.                                                                         
    



Family History

          



             Family Member Name Family Member Gender Family Member Status Date o

f Status 

Description                             Data Source(s)

 

           Unknown    Unknown    Problem                          [facility] ([facility])



                                                                                
       



Encounters

          



           Encounter  Providers  Location   Date       Indications Data Source(s

)

 

                Outpatient      Attender: Chaz SOLANO                 20 10:03:18 AM EST - 2021 

11:53:47 AM EST                                     [facility] ([facility]

)

 

                Outpatient      Attender: Milly Alcala BONI Reyes/Luis/Carlos/NE rush 10/13/2021 

09:00:00 AM EDT                                     [facility] ([facility]

actice, )



                                                                                
                 



Medications

          No Information                                                        
 



Insurance Providers

          



             Payer name   Policy type / Coverage type Policy ID    Covered party

 ID Covered 

party's relationship to tapia Policy Tapia             Plan Information

 

          SELF PAY ONLY           [id number]           SP                  [id number]

[id number]

 

          [facility] PLAN [facility]           [id number]                      

       [id number]

 

          [facility]       I         [id number]           Self                [id number]

 

          [employer] [facility] [employer] Plan/ [id number]           Self      

          [id number]

 

          [employer] [facility] [employer] Plan/ [id number]           Self      

          [id number]

 

          SELF PAY ONLY           UNAVAILABLE                               UNAV

AILABLE

 

                    O         UNAVAILABLE                               UNAVAILA

BLE

 

          Self Pay  [employer]           [id number] 

Self                 

 

          [employer]           [id number]           SP             

     [id number]

 

          SELF PAY            UNAVAILABLE           SP                  UNAVAILA

BLE

 

          [employer] CCN           [id number]           SP                  [id number]

18

 

          MEDICARE            [id number]           SP                  [id number]

[id number]

 

          [facility] COMMUNITY PLAN [facility]           [id number]                      

       [id number]

 

                [employer]      [id number]       

[id number] Self                                    

[id number]



                                                                                
                                                                                
                                                        



Problems, Conditions, and Diagnoses

          No Information                                                        
                               



Surgeries/Procedures

          



             Procedure    Description  Date         Indications  Data Source(s)

 

             OFFICE OUTPATIENT NEW 30 MINUTES              10/13/2021 12:00:00 A

M EDT              [facility] 

([facility], )



                                                                                
       



Results

          



                    ID                  Date                Data Source

 

                    [id number]            10/06/2021 05:24:00 PM EDT [facility]









          Name      Value     Range     Interpretation Code Description Data Buffy

rce(s) Supporting 

Document(s)

 

          SARS-CoV-2 (COVID 19) NEGATIVE - SARS-CoV-2 (COVID19)                 

              [facility]     

 

                                        This lab was ordered by [facility] a

nd reported by [facility].











                    ID                  Date                Data Source

 

                    [id number]             2021 07:53:00 AM EDT [facility]









          Name      Value     Range     Interpretation Code Description Data Buffy

rce(s) Supporting 

Document(s)

 

          SARS-CoV-2 (COVID 19) NEGATIVE - SARS-CoV-2 (COVID19)                 

              [facility]     

 

                                        This lab was ordered by [facility] a

nd reported by [facility].









                                        Procedure

 

                                          



                                                                                
                           



Social History

          No Information                                                        
                               



Vital Signs

          



                    ID                  Date                Data Source

 

                    UNK                                      









           Name       Value      Range      Interpretation Code Description Data

 Source(s)

 

           Diastolic blood pressure 66 mm[Hg]                        66 mm[Hg]  

[facility] ([facility])

 

           Heart rate 82 /min                          82 /min    [facility] ([facility])

 

           Body temperature 97.1 [degF]                       97.1 [degF] [facility]

 ([facility])

 

           Body height 72 [in_i]                        72 [in_i]  [facility] ([facility])

 

                                        6'0" 

 

           Body weight 161.38 [lb_av]                       161.38 [facility]av] [facility]

 ([facility]

 

           Body mass index (BMI) [Ratio] 21.9 kg/m2                       21.9 k

g/m2 [facility] ([facility])

 

           Ideal body weight 178 [lb_av]                       178 [lb_av] [facility]

T ([facility])

 

           Body weight 73.200 kg                        73.200 kg  [facility] ([facility])

 

           Body surface area Derived from formula 1.94 m2                       

   1.94 m2    [facility] ([facility])

 

           Systolic blood pressure 93 mm[Hg]                        93 mm[Hg]  [facility]

[facility]

## 2021-11-16 NOTE — REP
INDICATION:

SOB hypoxia



COMPARISON:

None.



TECHNIQUE:

CT angiography of the chest after the intravenous administration of 75 cc Isovue 370

attention pulmonary arteries.



FINDINGS:

There is excellent visualization of the pulmonary arterial vasculature.  There are no

focal filling defects identified that would be considered consistent with acute

pulmonary emboli.  The pulmonary arterial vasculature is unchanged from the prior

exam.  There is no change in appearance of the thoracic aorta.  There is no evidence

of an abnormality.  There are no pleural or pericardial effusions.  There is

mediastinal and bilateral hilar adenopathy status quo.  There is no significant change

in the appearance of the imaged upper abdomen or imaged osseous structures.



Evaluation of the lung fields shows a new development of significant debris in the

bronchus intermedius and throughout multiple right lower lobe bronchioles.  The lung

fields are hypoexpanded.  There are bibasilar patchy densities.  This has improved on

the left but is unchanged on the right.



IMPRESSION:

1. There is no evidence of a pulmonary embolism.

2. Evidence of mucous plugging on the right as described above.  This be correlated

clinically with appropriate follow-up.

3. Adenopathy etiology uncertain.

4. Other findings as described above.





<Electronically signed by Armaan Bronson > 11/16/21 [id number]

## 2021-11-16 NOTE — HPEPDOC
[facility] Medical History & Physical


Date of Admission


2021


Date of Service:  2021


Attending Physician:  ANNI GALAN MD





History and Physical


CHIEF COMPLAINT: Shortness of breath





HISTORY OF PRESENT ILLNESS: Patient is a 51-year-old male recently hospitalized 

for community-acquired pneumonia and COPD who presents with a 3-day history of 

progressively worsening shortness of breath cough productive of yellow/green 

sputum and nasal/chest congestion.  He states he has not had his albuterol or 

Advair in the last 2 months and was recently hospitalized a month ago for 

pneumonia.  He has not followed up with his outpatient provider since then and 

has not requested a refill of his medications either.  He denies any fever, 

chills, chest pain/pressure, abdominal pain, nausea, vomiting, dysuria, melena, 

hematochezia, lower extremity swelling or edema.





PAST MEDICAL HISTORY:


?COPD


Hx of bradycardia/complete heart block s/p pacemaker placement


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





PAST SURGICAL HISTORY:


Pacemaker placement in 2018





SOCIAL HISTORY:


Lives with his girlfriend is currently on disability


Ex-smoker, quit in 2018, 11-pack-year history.


Drinks alcohol occasionally


Previously smoked marijuana, but currently denies any marijuana, heroin, PCP, 

cocaine use





FAMILY HISTORY:


Reviewed, noncontributory.





ALLERGIES: Please see below.





REVIEW OF SYSTEMS:


Constitutional: Denies fevers, chills, night sweats, or recent unexpected weight

change


HEENT: Denies headaches, head trauma, no visual changes or eye pain, denies 

nosebleeds or difficulty swallowing.


Cardiovascular: Denies chest pain, palpitations, or orthopnea.


Respiratory: As above


GI: Denies nausea, vomiting, abdominal pain, diarrhea, or constipation


: Denies pain with urination or frequency


Musculoskeletal: Denies joint pain or swelling


Neuro/psych: Denies muscle weakness or sensory loss


Skin: Denies skin rashes





10 point ROS complete; all negative otherwise stated above 





HOME MEDICATIONS: Please see below. 





PHYSICAL EXAMINATION:


VITAL SIGNS: See below


GENERAL APPEARANCE: Short of breath appearing male sitting upright in bed with 

nonrebreather in place speaking in complete sentences in no acute distress using

supraclavicular muscles of respiration.


HEENT: NC, AT, EOMI, no scleral icterus, moist mucous membranes, no pharyngeal 

erythema.


CARDIOVASCULAR: RRR, normal S1-S2. No murmurs, gallops, rubs.


LUNGS: Diminished breath sounds bilaterally with shortened inspiratory and 

expiratory phase and significant rhonchi and transmitted upper airway sounds 

throughout but does not go away even with a cough.  End expiratory/inspiratory 

wheezing bilaterally, positive rhonchi, no crackles.


ABDOMEN: Soft, nontender, nondistended, bowel sounds present. No 

hepatosplenomegaly. 


EXTREMITIES: No swelling or edema


NEUROLOGICAL: No focal or sensory deficits. CN II-XII grossly intact.


PSYCHIATRIC: Normal mood and affect





LABORATORY DATA: See below.





IMAGIN2021 CT angiogram chest:


"Evaluation of the lung fields shows a new development of significant debris in 

the bronchus intermedius and throughout multiple right lower lobe bronchioles. 

The lung


fields are hypoexpanded. There are bibasilar patchy densities. This has improved

on the left but is unchanged on the right.


IMPRESSION:


1. There is no evidence of a pulmonary embolism.


2. Evidence of mucous plugging on the right as described above.  This be 

correlated clinically with appropriate follow-up.


3. Adenopathy etiology uncertain.


4. Other findings as described above."





MICROBIOLOGY: Please see below. 





Assessment/Plan:


#.  Acute hypoxic respiratory failure 2/2 COPD exacerbation


-The patient denies any history of asthma, COPD, emphysema it seems likely based

on his prior history of using albuterol and Advair that he has a diagnosis of 

COPD and we will treat it as such.


-Patient seemed to improve on nonrebreather and had been weaned down to 10 L at 

50% FiO2 while we were in the room and tolerating this therapy adequately


-DuoNeb treatments, IV Solu-Medrol, pulmonary toilet


-Restarting Advair, albuterol inhaler once discharged





#. ?CAP


-Not convinced this is truly CAP, elevated WBC count but remaining symptoms 

consistent with COPD, will also order procalcitonin, lactic acid. 


-Vanc/Zosyn changed to Rocephin and doxy for community coverage


-Trending blood culture, sputum culture. 





#.  Muscular dystrophy


-Takes no medications for this, uses a walker at home





#.Elevated troponin


-Likely type2 if it continues to rise, patient not complaining of chest 

pain/discomfort


-EKG unchanged from baseline, does appear to have baseline LBBB





DVT prophylaxis: Lovenox





Disposition: Inpatient expect at least 2 midnight


CODE status: Full code





Vital Signs





Vital Signs








  Date Time  Temp Pulse Resp B/P (MAP) Pulse Ox O2 Delivery O2 Flow Rate FiO2


 


21 16:53   20     


 


21 16:32      Non-Rebreather 10.0 


 


21 15:55     93   50


 


21 12:47 98.7 130  111/71 (84)    











Laboratory Data


Labs 24H


Laboratory Tests 2


21 13:38: POC Troponin I (Misc) 0.02


21 13:41: 


Immature Granulocyte % (Auto) 0.4, Neutrophils (%) (Auto) 75.5H, Lymphocytes (%)

(Auto) 12.5L, Monocytes (%) (Auto) 8.9H, Eosinophils (%) (Auto) 2.4, Basophils 

(%) (Auto) 0.3, Neutrophils # (Auto) 10.5H, Lymphocytes # (Auto) 1.7, Monocytes 

# (Auto) 1.2H, Eosinophils # (Auto) 0.3, Basophils # (Auto) 0.0, Nucleated Red 

Blood Cells % (auto) 0.0, Anion Gap 5L, Glomerular Filtration Rate > 60.0, 

Calcium Level 9.5, Total Bilirubin 0.8, Direct Bilirubin 0.2, Aspartate Amino 

Transf (AST/SGOT) 17, Alanine Aminotransferase (ALT/SGPT) 20, Alkaline 

Phosphatase 101, Total Protein 7.7, Albumin 3.7, Albumin/Globulin Ratio 0.9


21 13:56: 


Coronavirus (COVID-19)(PCR) NEGATIVE, Influenza Type A (RT-PCR) NEGATIVE, 

Influenza Type B (RT-PCR) NEGATIVE, Respiratory Syncytial Virus (PCR) NEGATIVE


21 15:47: 


Blood Gas Bicarbonate Standard 23.3, Arterial Blood pH 7.381, Arterial Blood 

Partial Pressure CO2 41.0, Arterial Blood Partial Pressure O2 61.4L, Arterial 

Blood Total CO2 25.0, Arterial Blood HCO3 23.8, Arterial Blood Base Excess -1.3,

Arterial Blood Oxygen Saturation 91.6L


CBC/BMP


Laboratory Tests


21 13:41











Home Medications


Scheduled


Multivitamins (Thera M Plus Tablet) 1 Each Tablet, 1 TAB PO DAILY





Allergies


Coded Allergies:  


     No Known Allergies (Unverified , 21)





GME ATTESTATION


GME ATTESTATION


My faculty preceptor for this patient encounter was physically present during 

the encounter and was fully available. All aspects of the patient interview, 

examination, medical decision making process, and medical care plan development 

were reviewed and approved by the faculty preceptor. The faculty preceptor is 

aware and concurs with the plan as stated in the body of this note and will 

attest to such by his/her cosignature.





ATTENDING NOTE


I, Anni Galan, have independently examined this patient and performed my own 

physical exam, as well as reviewed the documentation and edited where necessary 

with the resident. For medical students we have performed the physical exam 

together and discussed medical decision making and I have verified the history. 

I have discussed in detail with the resident / student the findings and plan of 

treatment as documented by the resident / student and edited their note. I agree

with their findings and treatment plan and have edited their documentation. I 

will continue to follow the patient during this hospital stay.











STACY MENJIVAR DO                 2021 18:14


ANNI GALAN MD                2021 20:15

## 2021-11-16 NOTE — CCD
Continuity of Care Document (CCD)

                             Created on: 10/20/2021



Cedric De Santiago

External Reference #: [id number]

: 1970

Sex: Male



Demographics





                          Address                   [address] [place] [address]  [id number]

 

                          Home Phone                [phone]

 

                          Preferred Language        Unknown

 

                          Marital Status            Unknown

 

                          Caodaism Affiliation     Unknown

 

                          Race                      Black or 

 

                          Ethnic Group              Not  or 





Author





                          Author                    Cedric MEYERS PA

 

                          Organization              Unknown

 

                          Address                   [address], [address]

[place]  [address]



 

                          Phone                     [phone]







Care Team Providers





                    Care Team Member Name Role                Phone

 

                    Min Guo M.D.  AUTM                [phone]







Problems





                                        Description

 

                                        No Information Available







Social History





                Type            Date            Description     Comments

 

                Birth Sex                       Unknown          

 

                ETOH Use                        Occasionally consumes alcohol  

 

                Recreational Drug Use                 Denies Drug Use  

 

                Tobacco Use     Start: Unknown End: Unknown Patient is a former 

smoker  







Allergies and adverse reactions





                                        Description

 

                                        No Known Drug Allergies







Medications





           Active Medications SIG        Qnty       Indications Ordering Provide

r Date

 

                                        Vitamin D (Ergocalciferol)              

       1.25mg (42114 Ut) Capsules       

             1 tab q week                           Unknown      

 

                          Cefdinir                     300mg Capsules           

        Take 1 Capsule By 

Mouth Twice Daily                                 Gabriela Rouse M.D. 

 

                          Doxycycline Hyclate                     100mg Tablets 

                  Take 1 

Tablet By Mouth Twice Daily                                 Gabriela Rouse M.D. 







Immunizations





                                        Description

 

                                        No Information Available







Vital Signs





                Date            Vital           Result          Comment

 

                10/13/2021  9:01am BP Systolic     93 mmHg          

 

                    BP Diastolic        66 mmHg              

 

                    Heart Rate          82 /min              

 

                    Body Temperature    97.1 F             

 

                    Height              72 inches           6'0"

 

                    Weight              161.38 lb            

 

                    BMI (Body Mass Index) 21.9 kg/m2           

 

                    Ideal Body Weight   178 lb               

 

                    Weight              73.200 kg            

 

                    BSA (Body Surface Area) 1.94 m2              







Results





                                        Description

 

                                        No Information Available







Procedures





                Date            Code            Description     Status

 

                10/13/2021      335[address]           Office/Outpatient New Low MDM 30

-44 Minutes Completed







Medical Devices





                                        Description

 

                                        No Information Available







Encounters





           Type       Date       Location   Provider   Dx         Diagnosis

 

             Office Visit 10/13/2021  9:00a [facility] XIOMARA Cox 

Z12.11                                  Encounter for screening for malignant ne

oplasm of colon







Assessments





                Date            Code            Description     Provider

 

                10/13/2021      Z12.11          Encounter for screening for karen

gnant neoplasm of colon XIOMARA Zafar







Plan of Treatment

Future Appointment(s):* 2021 10:00 am - XIOMARA Zafar at [facility]

* 2021 10:00 am - Epifanio Lind DO at [facility]

10/13/2021 - XIOMARA Zafar* Z12.11 Encounter for screening for malignant 
  neoplasm of colon





Functional Status





                                        Description

 

                                        No Information Available







Mental Status





                                        Description

 

                                        No Information Available







Referrals





                Refer to      Reason for Referral Status          Appt Date

 

                Gosselin JR, Leo J, MD  SCHEDULE COLONOSCOPY AND EGD  Scheduled 

      10/13/2021

 

                                        [address] [place] [phone]

## 2021-11-17 VITALS — SYSTOLIC BLOOD PRESSURE: 107 MMHG | OXYGEN SATURATION: 98 % | DIASTOLIC BLOOD PRESSURE: 62 MMHG

## 2021-11-17 VITALS — SYSTOLIC BLOOD PRESSURE: 119 MMHG | DIASTOLIC BLOOD PRESSURE: 65 MMHG

## 2021-11-17 VITALS — OXYGEN SATURATION: 95 %

## 2021-11-17 VITALS — OXYGEN SATURATION: 93 %

## 2021-11-17 VITALS — DIASTOLIC BLOOD PRESSURE: 69 MMHG | SYSTOLIC BLOOD PRESSURE: 118 MMHG

## 2021-11-17 VITALS — OXYGEN SATURATION: 98 % | SYSTOLIC BLOOD PRESSURE: 120 MMHG | DIASTOLIC BLOOD PRESSURE: 69 MMHG

## 2021-11-17 VITALS — OXYGEN SATURATION: 98 %

## 2021-11-17 VITALS — DIASTOLIC BLOOD PRESSURE: 61 MMHG | SYSTOLIC BLOOD PRESSURE: 115 MMHG

## 2021-11-17 VITALS — OXYGEN SATURATION: 97 %

## 2021-11-17 LAB
BUN SERPL-MCNC: 10 MG/DL (ref 7–18)
CALCIUM SERPL-MCNC: 9.8 MG/DL (ref 8.5–10.1)
CHLORIDE SERPL-SCNC: 105 MEQ/L (ref 98–107)
CO2 SERPL-SCNC: 29 MEQ/L (ref 21–32)
CREAT SERPL-MCNC: 0.7 MG/DL (ref 0.7–1.3)
GFR SERPL CREATININE-BSD FRML MDRD: > 60 ML/MIN/{1.73_M2} (ref 56–?)
GLUCOSE SERPL-MCNC: 157 MG/DL (ref 70–100)
HCT VFR BLD AUTO: 47.3 % (ref 42–52)
HGB BLD-MCNC: 15.1 G/DL (ref 13.5–17.5)
MCH RBC QN AUTO: 30.1 PG (ref 27–33)
MCHC RBC AUTO-ENTMCNC: 31.9 G/DL (ref 32–36.5)
MCV RBC AUTO: 94.4 FL (ref 80–96)
PLATELET # BLD AUTO: 270 10^3/UL (ref 150–450)
POTASSIUM SERPL-SCNC: 4.2 MEQ/L (ref 3.5–5.1)
RBC # BLD AUTO: 5.01 10^6/UL (ref 4.3–6.1)
SODIUM SERPL-SCNC: 138 MEQ/L (ref 136–145)
WBC # BLD AUTO: 9.8 10^3/UL (ref 4–10)

## 2021-11-17 RX ADMIN — FLUTICASONE PROPIONATE AND SALMETEROL XINAFOATE SCH PUFF: 230; 21 AEROSOL, METERED RESPIRATORY (INHALATION) at 09:39

## 2021-11-17 RX ADMIN — CEFTRIAXONE SCH MLS/HR: 1 INJECTION, POWDER, FOR SOLUTION INTRAMUSCULAR; INTRAVENOUS at 23:22

## 2021-11-17 RX ADMIN — IPRATROPIUM BROMIDE AND ALBUTEROL SULFATE SCH ML: .5; 3 SOLUTION RESPIRATORY (INHALATION) at 02:41

## 2021-11-17 RX ADMIN — DEXTROSE MONOHYDRATE SCH MLS/HR: 5 INJECTION INTRAVENOUS at 21:39

## 2021-11-17 RX ADMIN — IPRATROPIUM BROMIDE AND ALBUTEROL SULFATE SCH ML: .5; 3 SOLUTION RESPIRATORY (INHALATION) at 09:38

## 2021-11-17 RX ADMIN — IPRATROPIUM BROMIDE AND ALBUTEROL SULFATE SCH ML: .5; 3 SOLUTION RESPIRATORY (INHALATION) at 20:36

## 2021-11-17 RX ADMIN — IPRATROPIUM BROMIDE AND ALBUTEROL SULFATE SCH ML: .5; 3 SOLUTION RESPIRATORY (INHALATION) at 16:18

## 2021-11-17 RX ADMIN — METHYLPREDNISOLONE SODIUM SUCCINATE SCH MG: 125 INJECTION, POWDER, FOR SOLUTION INTRAMUSCULAR; INTRAVENOUS at 01:18

## 2021-11-17 RX ADMIN — FLUTICASONE PROPIONATE AND SALMETEROL XINAFOATE SCH PUFF: 230; 21 AEROSOL, METERED RESPIRATORY (INHALATION) at 20:36

## 2021-11-17 RX ADMIN — DEXTROSE MONOHYDRATE SCH MLS/HR: 5 INJECTION INTRAVENOUS at 09:29

## 2021-11-17 RX ADMIN — ENOXAPARIN SODIUM SCH MG: 40 INJECTION SUBCUTANEOUS at 09:29

## 2021-11-17 RX ADMIN — METHYLPREDNISOLONE SODIUM SUCCINATE SCH MG: 125 INJECTION, POWDER, FOR SOLUTION INTRAMUSCULAR; INTRAVENOUS at 16:56

## 2021-11-17 RX ADMIN — METHYLPREDNISOLONE SODIUM SUCCINATE SCH MG: 125 INJECTION, POWDER, FOR SOLUTION INTRAMUSCULAR; INTRAVENOUS at 07:53

## 2021-11-17 NOTE — IPNPDOC
Text Note


Date of Service


The patient was seen on 11/17/21.





NOTE


Subjective: No acute events overnight.  Patient states he feels like he is 50% 

better in terms of his breathing, states his chest congestion is unchanged but 

feels like the chest PT has been helping.  Denies any fever, chills, chest pain,

abdominal pain, nausea, vomiting.





Objective:


VITAL SIGNS: See below


GENERAL APPEARANCE: Short of breath appearing male sitting upright in bed with 

nonrebreather in place speaking in complete sentences in no acute distress 

without use of accessory muscles of respiration.


HEENT: NC, AT, EOMI, no scleral icterus, moist mucous membranes, no pharyngeal 

erythema.


CARDIOVASCULAR: RRR, normal S1-S2. No murmurs, gallops, rubs.


LUNGS: Diminished breath sounds bilaterally but less than yesterday with end 

expiratory/inspiratory wheezing bilaterally, positive rhonchi, no crackles.


ABDOMEN: Soft, nontender, nondistended, bowel sounds present. 


EXTREMITIES: No swelling or edema


NEUROLOGICAL: No focal or sensory deficits. CN II-XII grossly intact.


PSYCHIATRIC: Normal mood and affect





Assessment/Plan:


#.  Acute hypoxic respiratory failure 2/2 COPD exacerbation


-The patient denies any history of asthma, COPD, emphysema it seems likely based

on his prior history of using albuterol and Advair that he has a diagnosis of 

COPD and we will treat it as such.


-Still on Ventimask at 10 L at 50% FiO2


-DuoNeb treatments, IV Solu-Medrol, pulmonary toilet


-Restarting Advair, albuterol inhaler once discharged





#. CAP


-White blood cell count trending down, pro calcitonin 0.27, lactic acid WNL


-Continue Rocephin/doxy (end date 11/22)


-Trending blood culture, sputum culture. 





#.  Muscular dystrophy


-Takes no medications for this, uses a walker at home





#.Elevated troponin


-Likely type2 if it continues to rise, patient not complaining of chest p

ain/discomfort


-EKG unchanged from baseline, does appear to have baseline LBBB





DVT prophylaxis: Lovenox





Disposition: Pending clinical improvement


CODE status: Full code





VS,Fishbone, I+O


VS, Fishbone, I+O


Laboratory Tests


11/16/21 13:41








11/17/21 08:07











Vital Signs








  Date Time  Temp Pulse Resp B/P (MAP) Pulse Ox O2 Delivery O2 Flow Rate FiO2


 


11/17/21 10:02  89   98 Venturi Mask  50


 


11/17/21 10:01    119/65 (83)    


 


11/17/21 09:42   23     


 


11/16/21 16:32       10.0 


 


11/16/21 12:47 98.7       














I&O- Last 24 Hours up to 6 AM 


 


 11/17/21





 06:00


 


Intake Total 150 ml


 


Balance 150 ml











GME ATTESTATION


GME ATTESTATION


My faculty preceptor for this patient encounter was physically present during 

the encounter and was fully available. All aspects of the patient interview, 

examination, medical decision making process, and medical care plan development 

were reviewed and approved by the faculty preceptor. The faculty preceptor is 

aware and concurs with the plan as stated in the body of this note and will 

attest to such by his/her cosignature.





ATTENDING NOTE


I, Anni Galan, have independently examined this patient and performed my own 

physical exam, as well as reviewed the documentation and edited where necessary 

with the resident. For medical students we have performed the physical exam 

together and discussed medical decision making and I have verified the history. 

I have discussed in detail with the resident / student the findings and plan of 

treatment as documented by the resident / student and edited their note. I agree

with their findings and treatment plan and have edited their documentation. I 

will continue to follow the patient during this hospital stay.











STACY MENJIVAR DO                 Nov 17, 2021 10:12


ANNI GALAN MD                Nov 17, 2021 10:37

## 2021-11-18 VITALS — OXYGEN SATURATION: 95 %

## 2021-11-18 VITALS — OXYGEN SATURATION: 93 % | DIASTOLIC BLOOD PRESSURE: 60 MMHG | SYSTOLIC BLOOD PRESSURE: 116 MMHG

## 2021-11-18 VITALS — OXYGEN SATURATION: 96 % | DIASTOLIC BLOOD PRESSURE: 63 MMHG | SYSTOLIC BLOOD PRESSURE: 105 MMHG

## 2021-11-18 VITALS — OXYGEN SATURATION: 94 %

## 2021-11-18 VITALS — SYSTOLIC BLOOD PRESSURE: 110 MMHG | OXYGEN SATURATION: 92 % | DIASTOLIC BLOOD PRESSURE: 63 MMHG

## 2021-11-18 VITALS — OXYGEN SATURATION: 99 %

## 2021-11-18 VITALS — OXYGEN SATURATION: 97 %

## 2021-11-18 VITALS — OXYGEN SATURATION: 96 %

## 2021-11-18 LAB
BUN SERPL-MCNC: 12 MG/DL (ref 7–18)
BUN SERPL-MCNC: 14 MG/DL (ref 7–18)
BUN SERPL-MCNC: 15 MG/DL (ref 7–18)
CALCIUM SERPL-MCNC: 10.1 MG/DL (ref 8.5–10.1)
CALCIUM SERPL-MCNC: 10.2 MG/DL (ref 8.5–10.1)
CALCIUM SERPL-MCNC: 9.5 MG/DL (ref 8.5–10.1)
CHLORIDE SERPL-SCNC: 105 MEQ/L (ref 98–107)
CHLORIDE SERPL-SCNC: 106 MEQ/L (ref 98–107)
CHLORIDE SERPL-SCNC: 108 MEQ/L (ref 98–107)
CO2 SERPL-SCNC: 26 MEQ/L (ref 21–32)
CO2 SERPL-SCNC: 26 MEQ/L (ref 21–32)
CO2 SERPL-SCNC: 30 MEQ/L (ref 21–32)
CREAT SERPL-MCNC: 0.67 MG/DL (ref 0.7–1.3)
CREAT SERPL-MCNC: 0.67 MG/DL (ref 0.7–1.3)
CREAT SERPL-MCNC: 0.78 MG/DL (ref 0.7–1.3)
GFR SERPL CREATININE-BSD FRML MDRD: > 60 ML/MIN/{1.73_M2} (ref 56–?)
GLUCOSE SERPL-MCNC: 153 MG/DL (ref 70–100)
GLUCOSE SERPL-MCNC: 156 MG/DL (ref 70–100)
GLUCOSE SERPL-MCNC: 239 MG/DL (ref 70–100)
HCT VFR BLD AUTO: 45 % (ref 42–52)
HGB BLD-MCNC: 14.3 G/DL (ref 13.5–17.5)
MAGNESIUM SERPL-MCNC: 2.8 MG/DL (ref 1.8–2.4)
MCH RBC QN AUTO: 30 PG (ref 27–33)
MCHC RBC AUTO-ENTMCNC: 31.8 G/DL (ref 32–36.5)
MCV RBC AUTO: 94.5 FL (ref 80–96)
PLATELET # BLD AUTO: 261 10^3/UL (ref 150–450)
POTASSIUM SERPL-SCNC: 4.7 MEQ/L (ref 3.5–5.1)
POTASSIUM SERPL-SCNC: 5.1 MEQ/L (ref 3.5–5.1)
POTASSIUM SERPL-SCNC: 5.6 MEQ/L (ref 3.5–5.1)
RBC # BLD AUTO: 4.76 10^6/UL (ref 4.3–6.1)
SODIUM SERPL-SCNC: 138 MEQ/L (ref 136–145)
SODIUM SERPL-SCNC: 139 MEQ/L (ref 136–145)
SODIUM SERPL-SCNC: 140 MEQ/L (ref 136–145)
WBC # BLD AUTO: 9 10^3/UL (ref 4–10)

## 2021-11-18 RX ADMIN — IPRATROPIUM BROMIDE AND ALBUTEROL SULFATE SCH ML: .5; 3 SOLUTION RESPIRATORY (INHALATION) at 07:26

## 2021-11-18 RX ADMIN — IPRATROPIUM BROMIDE AND ALBUTEROL SULFATE SCH ML: .5; 3 SOLUTION RESPIRATORY (INHALATION) at 02:00

## 2021-11-18 RX ADMIN — ENOXAPARIN SODIUM SCH MG: 40 INJECTION SUBCUTANEOUS at 09:32

## 2021-11-18 RX ADMIN — FLUTICASONE PROPIONATE AND SALMETEROL XINAFOATE SCH PUFF: 230; 21 AEROSOL, METERED RESPIRATORY (INHALATION) at 07:26

## 2021-11-18 RX ADMIN — METHYLPREDNISOLONE SODIUM SUCCINATE SCH MG: 125 INJECTION, POWDER, FOR SOLUTION INTRAMUSCULAR; INTRAVENOUS at 00:48

## 2021-11-18 RX ADMIN — DEXTROSE MONOHYDRATE SCH MLS/HR: 5 INJECTION INTRAVENOUS at 09:32

## 2021-11-18 NOTE — DS.PDOC
Discharge Summary


General


Date of Admission


2021 at 20:10


Date of Discharge


21


Attending Physician:  ANNI GALAN MD





Discharge Summary


PROCEDURES PERFORMED DURING STAY: None.





ADMITTING/DISCHARGE DIAGNOSES: 


Acute hypoxic respiratory failure 2/2 COPD exacerbation


Community acquired pneumonia


Muscular dystrophy (follows w/ [place] [facility])


Hx of complete heart block s/p pacemaker placement





COMPLICATIONS/CHIEF COMPLAINT: Dyspnea.





HISTORY OF PRESENT ILLNESS: Patient is a 51-year-old male recently hospitalized 

for community-acquired pneumonia and COPD who presents with a 3-day history of 

progressively worsening shortness of breath cough productive of yellow/green 

sputum and nasal/chest congestion.  He states he has not had his albuterol or 

Advair in the last 2 months and was recently hospitalized a month ago for 

pneumonia.  He has not followed up with his outpatient provider since then and 

has not requested a refill of his medications either.  He denies any fever, 

chills, chest pain/pressure, abdominal pain, nausea, vomiting, dysuria, melena, 

hematochezia, lower extremity swelling or edema.





HOSPITAL COURSE: Patient was started of IV steroids, duoneb treatments, and IV 

antibiotics to cover for CAP. He improved over subsequent days with treatment as

his oxygen requirements decreased. He was discharged home with an albuterol 

inhaler, advair, doxycycline, and cefdinir with instructions to follow up with 

PCP and get formal testing for COPD/asthma. Patient was ambulated in the hallway

with PT and did well saturating 90-93% at the lowest during a lap around the 

unit without any shortness of breath.  





DISCHARGE MEDICATIONS: Please see below.


 


ALLERGIES: Please see below.





PHYSICAL EXAMINATION ON DISCHARGE:


VITAL SIGNS: See below


GENERAL APPEARANCE: Well appearing male sitting upright in bed in no acute 

distress


HEENT: NC, AT, EOMI, no scleral icterus, moist mucous membranes, no pharyngeal 

erythema.


CARDIOVASCULAR: RRR, normal S1-S2. No murmurs, gallops, rubs.


LUNGS: CTAB with some mild rhonchi throughout lungs, no wheezing or crackles. 


ABDOMEN: Soft, nontender, nondistended, bowel sounds present. 


EXTREMITIES: No swelling or edema


NEUROLOGICAL: No focal or sensory deficits. CN II-XII grossly intact.


PSYCHIATRIC: Normal mood and affect








LABORATORY DATA: Please see below.





IMAGIN2021 CT angiogram chest:


"Evaluation of the lung fields shows a new development of significant debris in 

the bronchus intermedius and throughout multiple right lower lobe bronchioles. 

The lung fields are hypoexpanded. There are bibasilar patchy densities. This has

improved on the left but is unchanged on the right.


IMPRESSION:


1. There is no evidence of a pulmonary embolism.


2. Evidence of mucous plugging on the right as described above.  This be 

correlated clinically with appropriate follow-up.


3. Adenopathy etiology uncertain.


4. Other findings as described above."





PROGNOSIS: good





ACTIVITY: As tolerated.





DIET: COPD diet





DISCHARGE PLAN: home





DISCHARGE INSTRUCTIONS:


1. Please return to hospital if symptoms worsen, please complete antibiotic 

regimen, and follow up with PCP in the next 7 days. Please undergo formal 

evaluation for COPD/asthma. 





ITEMS TO FOLLOWUP ON ON OUTPATIENT:


1. PFTs, spirometry





DISCHARGE CONDITION: Stable.





TIME SPENT ON DISCHARGE: 33 minutes.





Vital Signs/I&Os





Vital Signs








  Date Time  Temp Pulse Resp B/P (MAP) Pulse Ox O2 Delivery O2 Flow Rate FiO2


 


21 08:00 96.7 80 18 110/63 (79) 92 Room Air  


 


21 07:00       4.0 


 


21 17:00        50














I&O- Last 24 Hours up to 6 AM 


 


 21





 06:00


 


Intake Total 250 ml


 


Output Total 0 ml


 


Balance 250 ml











Laboratory Data


Labs 24H


Laboratory Tests 2


21 06:12: 


Anion Gap 4L, Glomerular Filtration Rate > 60.0, Calcium Level 9.5


21 06:18: Nucleated Red Blood Cells % (auto) 0.0


21 08:29: 


Anion Gap 4L, Glomerular Filtration Rate > 60.0, Calcium Level 10.1, Magnesium 

Level 2.8H


21 11:28: 


CBC/BMP


Laboratory Tests


21 06:12








21 06:18








21 08:29














Microbiology





Microbiology


21 Blood Culture - Preliminary, Resulted


           No growth after 24 hours . All specim...


21 Blood Culture - Preliminary, Resulted


           No growth after 24 hours . All specim...





Discharge Medications


Scheduled


Cefdinir (Cefdinir) 300 Mg Capsule, 1 CAP PO BID


Doxycycline Monohydrate (Doxycycline) 100 Mg Capsule, 1 CAP PO BID


Guaifenesin (Mucinex) 600 Mg Tab.er.12h, 1 TAB PO BID for cough


Multivitamins (Thera M Plus Tablet) 1 Each Tablet, 1 TAB PO DAILY, (Reported)


Prednisone (Prednisone) 10 Mg Tablet, 10 MG PO TAPER


   Take 4 tabs daily x 3 days, then 3 tabs daily x 3 days, then 2 tabs daily x 3

days, then 1 tab daily x 3 days and stop 


Salmeterol/Fluticasone (Advair 250-50 Diskus) 1 Each Blst.w.dev, 1 PUFF INH BID





Scheduled PRN


Albuterol Sulfate (Proair Hfa) 8.5 Gm Hfa.aer.ad, 2 PUFF INH Q4-6HP PRN for 

wheezing





Allergies


Coded Allergies:  


     No Known Allergies (Unverified , 21)





GME ATTESTATION


GME ATTESTATION


My faculty preceptor for this patient encounter was physically present during 

the encounter and was fully available. All aspects of the patient interview, 

examination, medical decision making process, and medical care plan development 

were reviewed and approved by the faculty preceptor. The faculty preceptor is 

aware and concurs with the plan as stated in the body of this note and will 

attest to such by his/her cosignature.





ATTENDING NOTE


I, Anni Galan, have independently examined this patient and performed my own 

physical exam, as well as reviewed the documentation and edited where necessary 

with the resident. For medical students we have performed the physical exam 

together and discussed medical decision making and I have verified the history. 

I have discussed in detail with the resident / student the findings and plan of 

treatment as documented by the resident / student and edited their note. I agree

with their findings and treatment plan and have edited their documentation. I 

will continue to follow the patient during this hospital stay.





Time spent on discharge 35 minutes











STACY MENJIVAR DO                 2021 12:00


ANNI GALAN MD                2021 13:17

## 2021-11-19 NOTE — ECGEPIP
[facility]

                                       

                                       Test Date:    2021

Pat Name:     ESTEFANÍA LECHUGA         Department:   

Patient ID:   [id number]                 Room:         [place] [address]

Gender:       Male                     Technician:   COLT

:          1970               Requested By: STACY MENJIVAR 

Order Number: [id number]     Reading MD:   Keaton Medina

                                 Measurements

Intervals                              Axis          

Rate:         83                       P:            99

UT:                                    QRS:          -60

QRSD:         166                      T:            111

QT:           454                                    

QTc:          533                                    

                           Interpretive Statements

underlying sinus rhythm with isolated PVC

Dual-chamber pacemaker with appropriate atrial sensing and tracking with 

consistent ventricular pacing

Paced QRS complexes having leftward axis and LEFT BUNDLE BRANCH BLOCK

configuration in keeping with RV apical stimulation.

No change from 21

Electronically Signed on 2021 9:35:24 EST by Keaton Medina

## 2022-08-22 ENCOUNTER — HOSPITAL ENCOUNTER (OUTPATIENT)
Dept: HOSPITAL 53 - M PLARAD | Age: 52
End: 2022-08-22
Attending: INTERNAL MEDICINE
Payer: OTHER GOVERNMENT

## 2022-08-22 DIAGNOSIS — R91.8: Primary | ICD-10-CM

## 2022-08-22 DIAGNOSIS — R93.5: ICD-10-CM

## 2022-08-22 PROCEDURE — 78815 PET IMAGE W/CT SKULL-THIGH: CPT

## 2022-10-24 ENCOUNTER — HOSPITAL ENCOUNTER (EMERGENCY)
Dept: HOSPITAL 53 - M ED | Age: 52
LOS: 1 days | Discharge: LEFT BEFORE DISCHARGE | End: 2022-10-25
Payer: OTHER GOVERNMENT

## 2022-10-24 VITALS — HEIGHT: 72 IN | WEIGHT: 175.38 LBS | BODY MASS INDEX: 23.75 KG/M2

## 2022-10-24 VITALS — SYSTOLIC BLOOD PRESSURE: 103 MMHG | DIASTOLIC BLOOD PRESSURE: 59 MMHG

## 2022-10-24 DIAGNOSIS — Z53.21: Primary | ICD-10-CM

## 2022-10-24 LAB
APTT BLD: 32.6 SECONDS (ref 24.8–34.2)
BASOPHILS # BLD AUTO: 0 10^3/UL (ref 0–0.2)
BASOPHILS NFR BLD AUTO: 0.4 % (ref 0–1)
BUN SERPL-MCNC: 8 MG/DL (ref 7–18)
CALCIUM SERPL-MCNC: 9.1 MG/DL (ref 8.5–10.1)
CHLORIDE SERPL-SCNC: 103 MEQ/L (ref 98–107)
CK MB CFR.DF SERPL CALC: 0.58
CK MB SERPL-MCNC: < 1 NG/ML (ref ?–3.6)
CK SERPL-CCNC: 172 U/L (ref 39–308)
CO2 SERPL-SCNC: 30 MEQ/L (ref 21–32)
CREAT SERPL-MCNC: 0.76 MG/DL (ref 0.7–1.3)
EOSINOPHIL # BLD AUTO: 0 10^3/UL (ref 0–0.5)
EOSINOPHIL NFR BLD AUTO: 0.1 % (ref 0–3)
GFR SERPL CREATININE-BSD FRML MDRD: > 60 ML/MIN/{1.73_M2} (ref 56–?)
GLUCOSE SERPL-MCNC: 93 MG/DL (ref 70–100)
HCT VFR BLD AUTO: 47.3 % (ref 42–52)
HGB BLD-MCNC: 15.1 G/DL (ref 13.5–17.5)
INR PPP: 1.15
LYMPHOCYTES # BLD AUTO: 0.6 10^3/UL (ref 1.5–5)
LYMPHOCYTES NFR BLD AUTO: 7 % (ref 24–44)
MCH RBC QN AUTO: 29.6 PG (ref 27–33)
MCHC RBC AUTO-ENTMCNC: 31.9 G/DL (ref 32–36.5)
MCV RBC AUTO: 92.7 FL (ref 80–96)
MONOCYTES # BLD AUTO: 1.7 10^3/UL (ref 0–0.8)
MONOCYTES NFR BLD AUTO: 20.2 % (ref 2–8)
NEUTROPHILS # BLD AUTO: 6.2 10^3/UL (ref 1.5–8.5)
NEUTROPHILS NFR BLD AUTO: 71.9 % (ref 36–66)
PLATELET # BLD AUTO: 244 10^3/UL (ref 150–450)
POTASSIUM SERPL-SCNC: 4.1 MEQ/L (ref 3.5–5.1)
PROTHROMBIN TIME: 14.9 SECONDS (ref 12.5–14.5)
RBC # BLD AUTO: 5.1 10^6/UL (ref 4.3–6.1)
RSV RNA NPH QL NAA+PROBE: NEGATIVE
SODIUM SERPL-SCNC: 137 MEQ/L (ref 136–145)
WBC # BLD AUTO: 8.6 10^3/UL (ref 4–10)

## 2023-03-25 ENCOUNTER — HOSPITAL ENCOUNTER (EMERGENCY)
Dept: HOSPITAL 53 - M ED | Age: 53
Discharge: HOME | End: 2023-03-25
Payer: OTHER GOVERNMENT

## 2023-03-25 VITALS — HEIGHT: 72 IN | WEIGHT: 150.58 LBS | BODY MASS INDEX: 20.39 KG/M2

## 2023-03-25 VITALS — SYSTOLIC BLOOD PRESSURE: 110 MMHG | DIASTOLIC BLOOD PRESSURE: 72 MMHG
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
## 2023-03-25 DIAGNOSIS — S22.41XA: Primary | ICD-10-CM

## 2023-03-25 DIAGNOSIS — J45.909: ICD-10-CM

## 2023-03-25 DIAGNOSIS — R00.1: ICD-10-CM
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
